# Patient Record
Sex: FEMALE | Race: BLACK OR AFRICAN AMERICAN | Employment: STUDENT | URBAN - METROPOLITAN AREA
[De-identification: names, ages, dates, MRNs, and addresses within clinical notes are randomized per-mention and may not be internally consistent; named-entity substitution may affect disease eponyms.]

---
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
## 2024-10-14 LAB
ANTIBODY SCREEN OB: NEGATIVE
HEPATITIS B SURFACE ANTIGEN OB: NEGATIVE
HIV RESULT OB: NEGATIVE
RAPID PLASMA REAGIN OB: NONREACTIVE
RH FACTOR OB: POSITIVE

## 2025-02-13 ENCOUNTER — ULTRASOUND ENCOUNTER (OUTPATIENT)
Dept: PERINATAL CARE | Facility: HOSPITAL | Age: 20
End: 2025-02-13
Attending: OBSTETRICS & GYNECOLOGY
Payer: MEDICAID

## 2025-02-13 ENCOUNTER — HOSPITAL ENCOUNTER (INPATIENT)
Facility: HOSPITAL | Age: 20
LOS: 4 days | Discharge: HOME | End: 2025-02-17
Attending: OBSTETRICS & GYNECOLOGY | Admitting: OBSTETRICS & GYNECOLOGY
Payer: MEDICAID

## 2025-02-13 DIAGNOSIS — O60.02: Primary | ICD-10-CM

## 2025-02-13 DIAGNOSIS — R52 PAIN: Primary | ICD-10-CM

## 2025-02-13 DIAGNOSIS — R52 PAIN: ICD-10-CM

## 2025-02-13 PROBLEM — O60.00 PRETERM LABOR (HCC): Status: ACTIVE | Noted: 2025-02-13

## 2025-02-13 LAB
ALBUMIN SERPL-MCNC: 4.8 G/DL (ref 3.2–4.8)
ALBUMIN/GLOB SERPL: 1.5 {RATIO} (ref 1–2)
ALP LIVER SERPL-CCNC: 51 U/L
ALT SERPL-CCNC: <7 U/L
ANION GAP SERPL CALC-SCNC: 10 MMOL/L (ref 0–18)
AST SERPL-CCNC: 16 U/L (ref ?–34)
BASOPHILS # BLD AUTO: 0.02 X10(3) UL (ref 0–0.2)
BASOPHILS NFR BLD AUTO: 0.2 %
BILIRUB SERPL-MCNC: 0.7 MG/DL (ref 0.3–1.2)
BILIRUB UR QL STRIP.AUTO: NEGATIVE
BUN BLD-MCNC: 7 MG/DL (ref 9–23)
CALCIUM BLD-MCNC: 10.2 MG/DL (ref 8.7–10.6)
CHLORIDE SERPL-SCNC: 103 MMOL/L (ref 98–112)
CO2 SERPL-SCNC: 24 MMOL/L (ref 21–32)
COLOR UR AUTO: YELLOW
CREAT BLD-MCNC: 0.65 MG/DL
EGFRCR SERPLBLD CKD-EPI 2021: 128 ML/MIN/1.73M2 (ref 60–?)
EOSINOPHIL # BLD AUTO: 0.01 X10(3) UL (ref 0–0.7)
EOSINOPHIL NFR BLD AUTO: 0.1 %
ERYTHROCYTE [DISTWIDTH] IN BLOOD BY AUTOMATED COUNT: 13.5 %
FASTING STATUS PATIENT QL REPORTED: NO
FIBRONECTIN FETAL VAG QL: POSITIVE
GLOBULIN PLAS-MCNC: 3.1 G/DL (ref 2–3.5)
GLUCOSE BLD-MCNC: 74 MG/DL (ref 70–99)
GLUCOSE UR STRIP.AUTO-MCNC: NORMAL MG/DL
HCT VFR BLD AUTO: 35.2 %
HGB BLD-MCNC: 12.1 G/DL
IMM GRANULOCYTES # BLD AUTO: 0.05 X10(3) UL (ref 0–1)
IMM GRANULOCYTES NFR BLD: 0.5 %
KETONES UR STRIP.AUTO-MCNC: 40 MG/DL
LEUKOCYTE ESTERASE UR QL STRIP.AUTO: 500
LYMPHOCYTES # BLD AUTO: 2.11 X10(3) UL (ref 1–4)
LYMPHOCYTES NFR BLD AUTO: 22.1 %
MCH RBC QN AUTO: 31.7 PG (ref 26–34)
MCHC RBC AUTO-ENTMCNC: 34.4 G/DL (ref 31–37)
MCV RBC AUTO: 92.1 FL
MONOCYTES # BLD AUTO: 0.54 X10(3) UL (ref 0.1–1)
MONOCYTES NFR BLD AUTO: 5.6 %
NEUTROPHILS # BLD AUTO: 6.83 X10 (3) UL (ref 1.5–7.7)
NEUTROPHILS # BLD AUTO: 6.83 X10(3) UL (ref 1.5–7.7)
NEUTROPHILS NFR BLD AUTO: 71.5 %
NITRITE UR QL STRIP.AUTO: NEGATIVE
OSMOLALITY SERPL CALC.SUM OF ELEC: 281 MOSM/KG (ref 275–295)
PH UR STRIP.AUTO: 6.5 [PH] (ref 5–8)
PLATELET # BLD AUTO: 232 10(3)UL (ref 150–450)
POTASSIUM SERPL-SCNC: 3.5 MMOL/L (ref 3.5–5.1)
PROT SERPL-MCNC: 7.9 G/DL (ref 5.7–8.2)
PROT UR STRIP.AUTO-MCNC: 30 MG/DL
RBC # BLD AUTO: 3.82 X10(6)UL
RBC UR QL AUTO: NEGATIVE
SODIUM SERPL-SCNC: 137 MMOL/L (ref 136–145)
SP GR UR STRIP.AUTO: 1.03 (ref 1–1.03)
UROBILINOGEN UR STRIP.AUTO-MCNC: NORMAL MG/DL
WBC # BLD AUTO: 9.6 X10(3) UL (ref 4–11)

## 2025-02-13 PROCEDURE — 76817 TRANSVAGINAL US OBSTETRIC: CPT

## 2025-02-13 PROCEDURE — 76811 OB US DETAILED SNGL FETUS: CPT | Performed by: OBSTETRICS & GYNECOLOGY

## 2025-02-13 RX ORDER — BETAMETHASONE SODIUM PHOSPHATE AND BETAMETHASONE ACETATE 3; 3 MG/ML; MG/ML
12 INJECTION, SUSPENSION INTRA-ARTICULAR; INTRALESIONAL; INTRAMUSCULAR; SOFT TISSUE EVERY 24 HOURS
Status: COMPLETED | OUTPATIENT
Start: 2025-02-13 | End: 2025-02-14

## 2025-02-13 RX ORDER — CHOLECALCIFEROL (VITAMIN D3) 25 MCG
1 TABLET,CHEWABLE ORAL DAILY
Status: DISCONTINUED | OUTPATIENT
Start: 2025-02-13 | End: 2025-02-15

## 2025-02-13 RX ORDER — SODIUM CHLORIDE, SODIUM LACTATE, POTASSIUM CHLORIDE, CALCIUM CHLORIDE 600; 310; 30; 20 MG/100ML; MG/100ML; MG/100ML; MG/100ML
INJECTION, SOLUTION INTRAVENOUS CONTINUOUS
Status: DISCONTINUED | OUTPATIENT
Start: 2025-02-13 | End: 2025-02-15

## 2025-02-13 RX ORDER — ACETAMINOPHEN 500 MG
500 TABLET ORAL EVERY 6 HOURS PRN
Status: DISCONTINUED | OUTPATIENT
Start: 2025-02-13 | End: 2025-02-15

## 2025-02-13 RX ORDER — ACETAMINOPHEN 500 MG
1000 TABLET ORAL EVERY 6 HOURS PRN
Status: DISCONTINUED | OUTPATIENT
Start: 2025-02-13 | End: 2025-02-15

## 2025-02-13 RX ORDER — ZOLPIDEM TARTRATE 5 MG/1
5 TABLET ORAL NIGHTLY PRN
Status: DISCONTINUED | OUTPATIENT
Start: 2025-02-13 | End: 2025-02-15

## 2025-02-13 RX ORDER — ONDANSETRON 2 MG/ML
4 INJECTION INTRAMUSCULAR; INTRAVENOUS EVERY 6 HOURS PRN
Status: DISCONTINUED | OUTPATIENT
Start: 2025-02-13 | End: 2025-02-15

## 2025-02-13 RX ORDER — SODIUM CHLORIDE, SODIUM LACTATE, POTASSIUM CHLORIDE, CALCIUM CHLORIDE 600; 310; 30; 20 MG/100ML; MG/100ML; MG/100ML; MG/100ML
INJECTION, SOLUTION INTRAVENOUS CONTINUOUS
Status: DISCONTINUED | OUTPATIENT
Start: 2025-02-13 | End: 2025-02-15 | Stop reason: HOSPADM

## 2025-02-13 RX ORDER — NIFEDIPINE 20 MG/1
20 CAPSULE ORAL EVERY 8 HOURS SCHEDULED
Status: DISCONTINUED | OUTPATIENT
Start: 2025-02-13 | End: 2025-02-15

## 2025-02-13 RX ORDER — NIFEDIPINE 20 MG/1
20 CAPSULE ORAL EVERY 8 HOURS SCHEDULED
Status: DISPENSED | OUTPATIENT
Start: 2025-02-13 | End: 2025-02-14

## 2025-02-13 RX ORDER — ONDANSETRON 2 MG/ML
INJECTION INTRAMUSCULAR; INTRAVENOUS
Status: COMPLETED
Start: 2025-02-13 | End: 2025-02-13

## 2025-02-13 RX ORDER — CALCIUM CARBONATE 500 MG/1
1000 TABLET, CHEWABLE ORAL
Status: DISCONTINUED | OUTPATIENT
Start: 2025-02-13 | End: 2025-02-15

## 2025-02-13 RX ORDER — DOCUSATE SODIUM 100 MG/1
100 CAPSULE, LIQUID FILLED ORAL 2 TIMES DAILY
Status: DISCONTINUED | OUTPATIENT
Start: 2025-02-13 | End: 2025-02-14

## 2025-02-13 NOTE — H&P
On call Laborist 25    [facility]  Obstetrics and Gynecology    OB/GYN: Triage History and Physical        Subjective:     Klaudia Cerna is a 19 year old  female at 24w4d Estimated Date of Delivery: 25 who is being seen in triage with complaint of cramping off and on and possible \"loss of mucous plug\". The patient reports cramping off and on since yesterday. Was constipated but had BM today.. possible Ucx. neg LOF. neg VB. Fetal Movement: normal.    Review of Systems:  General:  denies fevers, chills, fatigue and malaise.   Respiratory:  denies SOB, dyspnea, cough or wheezing  Cardiovascular:  denies chest pain, palpitations, exercise intolerance   GI:cramping as above, neg diarrhea, + constipation, Hyperemesis earlier in pregnancy, + THC use at that time  :  denies dysuria, hematuria, increased urinary frequency.     Past Medical History:    Anxiety    Depression    H/O self-harm     H/o Disordered eating    No past surgical history on file.    Allergies[1]      Social History     Socioeconomic History    Marital status: Single     Spouse name: Not on file    Number of children: Not on file    Years of education: Not on file    Highest education level: Not on file   Occupational History    Not on file   Tobacco Use    Smoking status: Passive Smoke Exposure - Never Smoker    Smokeless tobacco: Never   Vaping Use    Vaping status: Never Used   Substance and Sexual Activity    Alcohol use: Never    Drug use: Never    Sexual activity: Not on file   Other Topics Concern    Not on file   Social History Narrative    Not on file     Social Drivers of Health     Food Insecurity: Food Insecurity Present (2025)    NCSS - Food Insecurity     Worried About Running Out of Food in the Last Year: Yes     Ran Out of Food in the Last Year: Yes   Transportation Needs: Unmet Transportation Needs (2025)    NCSS - Transportation     Lack of Transportation: Yes   Stress: Stress Concern Present  (2025)    Stress     Feeling of Stress : Yes   Housing Stability: Not At Risk (2025)    NCSS - Housing/Utilities     Has Housing: Yes     Worried About Losing Housing: No     Unable to Get Utilities: No      + THC use    No family history on file.    Roseline with hx of incompetent cervix       Objective:      Vitals:    25 1229   Weight: 130 lb (59 kg)         General:   alert, appears stated age, and cooperative   CV: Normal peripheral perfusion    Lungs: No tachypnea, non-labored breathing    Abdomen:  soft, non-tender; bowel sounds normal; no masses,  no organomegaly, gravid   Ext: No calf tenderness or edema bilaterally    FHT: mod variability / 140 BPM / pos accel / appropriate for gestational age   TOCO Irreg, irritability   SVE:     Dilation: Closed    Effacement: Long    Station:  Floating     Yellow mucous discharge     Assessment:     Klaudia Cerna is a 19 year old  female at 24w4d who is being seen in triage for cramping, mucous discharge  Limited prenatal care  Hx of anxiety, disordered eating--was on meds until found out was pregnant at 6 wks  Hx of thc use, with hyperemesis early in pregnancy.          Plan:     Gc/chlamydia from urine  Urinalysis  Fetal fibrinectin  Vaginitis probe done  Ivfs  Cbc, cmp  Mfm ultrasound.  Obervation at this time        Note to patient and family   The 21st Century Cures Act makes medical notes available to patients in the interest of transparency.  However, please be advised that this is a medical document.  It is intended as [id number]-[id number] communication.  It is written and medical language may contain abbreviations or verbiage that are technical and unfamiliar.  It may appear blunt or direct.  Medical documents are intended to carry relevant information, facts as evident, and the clinical opinion of the practitioner.                [1] No Known Allergies

## 2025-02-13 NOTE — PROGRESS NOTES
On call Laborist 25    Reviewed MFM findings.  Will give steroids  Nifedipine 20 mg q 8 hours  Continue ivfs    Reviewed with pt and family  Previous sab x 4---no dilation/curettage. No cervical instrumentation    Reviewed with pt/family  If seems that may have  delivery, will need magnesium sulfate.    Will await vaginitis panal    Mfm on consult.    All of above d/w pt and family  Questions answered.

## 2025-02-13 NOTE — CONSULTS
[facility]  Non-Surgical Consult    Klaudia Cerna Patient Status:  Outpatient    3/13/2005 MRN [id number]   Location [facility] LABOR & DELIVERY Attending Patria Gonsalez MD   Hosp Day # 0 PCP None Pcp     SUBJECTIVE:  Reason for Admission:  Pain and cramping.    History of Present Illness:  Patient is a 19 year old female.    Patient's last menstrual period was 2024.    She presented to ER complaint of cramping off and on and possible \"loss of mucous plug\". The patient reports cramping off and on since yesterday. Was constipated but had BM today.. Planning in transferring care to different doctor.    Fetal fibrinectin Positive.      Medications:  Prescriptions Prior to Admission[1]    Allergies:  Allergies[2]     Past Medical History:  Past Medical History:    Anxiety    Depression    H/O self-harm       Past Surgical History:  No past surgical history on file.    Past OB History:  OB History    Para Term  AB Living   5 0 0 0 4 0   SAB IAB Ectopic Multiple Live Births                  # Outcome Date GA Lbr Ramone/2nd Weight Sex Type Anes PTL Lv   5 Current            4 AB 2017     SAB  N    3 AB            2 AB            1 AB                  Social History:  Social History     Tobacco Use    Smoking status: Passive Smoke Exposure - Never Smoker    Smokeless tobacco: Never   Substance Use Topics    Alcohol use: Never        Review of Systems:  Unremarkable except as above    OBJECTIVE:  Temp:  [99 °F (37.2 °C)] 99 °F (37.2 °C)  Pulse:  [90] 90  Resp:  [18] 18  BP: (123)/(69) 123/69  SpO2:  [94 %] 94 %  No intake or output data in the 24 hours ending 25 1513    Physical Exam:  General appearance: alert, appears stated age, cooperative, and no distress  Heart: regular rate and rhythm  Abdomen: soft, non-tender; bowel sounds normal; no masses,  no organomegaly  Extremities: extremities normal, atraumatic, no cyanosis or edema  Neurologic: Grossly  normal    Diagnostics:    OB ULTRASOUND REPORT   See imaging tab for complete ultrasound report or in PACS    Single IUP in cephalic presentation.    Placenta is posterior.   A 3 vessel cord is noted.  Cardiac activity is present at 170 bpm   g ( 1 lb 11 oz);   MVP is 4.5 cm    Approach - Transvaginal Cervical length 13.8 mm  Cervical length with fundal pressure 14.0 mm  Funneling: Funnel width with fundal pressure 12.0 mm, Funnel length with fundal pressure 13.0 mm    Uterus and adnexa appeared normal today on ultrasound      ==============================================      Data Review:   Lab Results   Component Value Date    WBC 9.6 2025    HGB 12.1 2025    HCT 35.2 2025    .0 2025    CREATSERUM 0.65 2025    BUN 7 2025     2025    K 3.5 2025     2025    CO2 24.0 2025    GLU 74 2025    CA 10.2 2025    ALB 4.8 2025    ALKPHO 51 2025    BILT 0.7 2025    TP 7.9 2025    AST 16 2025    ALT <7 2025       DISCUSSION  During her visit we discussed and reviewed the following issues:    We discussed the morbidity and mortality associated with prematurity at various gestational ages.  The signs and symptoms of  labor were discussed.  We talked about potential risks and benefits associated with tocolytic therapy and  steroid.       ASSESSMENT:  IUP at 24w4d    labor  Short cervix-- 1.4 cm with funneling    PLAN:  Betamethasone  Tocolysis if regular contractions noted.    Discussed with Dr. Gonsalez    Thank you for allowing me to participate in the care of your patient.  Please do not hesitate to call with any questions or concerns.     Total floor time was 55 minutes in evaluation, consultation, and coordination of care.  Greater than 50% of this time was spent in face to face discussion with the patient.      Sid Allen D.O.  2025  3:13 PM         [1]    Medications Prior to Admission   Medication Sig Dispense Refill Last Dose/Taking    OXcarbazepine 150 MG Oral Tab Take by mouth 2 (two) times daily.   Unknown    ARIPiprazole 10 MG Oral Tab Take 1 tablet (10 mg total) by mouth daily.   Unknown    cephalexin 500 MG Oral Cap Take 1 capsule (500 mg total) by mouth 4 (four) times daily.      [2] No Known Allergies

## 2025-02-13 NOTE — PROGRESS NOTES
1431 Pt transferred in stable condition via wheelchair to [facility] for complete ultrasound.

## 2025-02-13 NOTE — PROGRESS NOTES
@ 1549 Received pt back from [facility] ultrasound. 1550 Dr. Gonsalez at bedside with updated POC.  New orders received. Pt then transferred to antepartum room 117 for stay.  Pt is calm and verbalized understanding.

## 2025-02-14 ENCOUNTER — ULTRASOUND ENCOUNTER (OUTPATIENT)
Dept: PERINATAL CARE | Facility: HOSPITAL | Age: 20
End: 2025-02-14
Attending: OBSTETRICS & GYNECOLOGY
Payer: MEDICAID

## 2025-02-14 LAB
ANTIBODY SCREEN: NEGATIVE
BV BACTERIA DNA VAG QL NAA+PROBE: POSITIVE
C GLABRATA DNA VAG QL NAA+PROBE: NEGATIVE
C KRUSEI DNA VAG QL NAA+PROBE: NEGATIVE
C TRACH DNA SPEC QL NAA+PROBE: NEGATIVE
CANDIDA DNA VAG QL NAA+PROBE: POSITIVE
N GONORRHOEA DNA SPEC QL NAA+PROBE: NEGATIVE
RH BLOOD TYPE: POSITIVE
RH BLOOD TYPE: POSITIVE
T VAGINALIS DNA VAG QL NAA+PROBE: NEGATIVE

## 2025-02-14 PROCEDURE — 76815 OB US LIMITED FETUS(S): CPT | Performed by: OBSTETRICS & GYNECOLOGY

## 2025-02-14 RX ORDER — BISACODYL 10 MG
10 SUPPOSITORY, RECTAL RECTAL
Status: DISCONTINUED | OUTPATIENT
Start: 2025-02-14 | End: 2025-02-15

## 2025-02-14 RX ORDER — SODIUM PHOSPHATE, DIBASIC AND SODIUM PHOSPHATE, MONOBASIC 7; 19 G/230ML; G/230ML
1 ENEMA RECTAL ONCE AS NEEDED
Status: DISCONTINUED | OUTPATIENT
Start: 2025-02-14 | End: 2025-02-15

## 2025-02-14 RX ORDER — TERBUTALINE SULFATE 1 MG/ML
INJECTION SUBCUTANEOUS
Status: COMPLETED
Start: 2025-02-14 | End: 2025-02-14

## 2025-02-14 RX ORDER — POLYETHYLENE GLYCOL 3350 17 G/17G
17 POWDER, FOR SOLUTION ORAL DAILY PRN
Status: DISCONTINUED | OUTPATIENT
Start: 2025-02-14 | End: 2025-02-15

## 2025-02-14 RX ORDER — HYDROMORPHONE HYDROCHLORIDE 1 MG/ML
0.2 INJECTION, SOLUTION INTRAMUSCULAR; INTRAVENOUS; SUBCUTANEOUS
Status: DISCONTINUED | OUTPATIENT
Start: 2025-02-14 | End: 2025-02-14

## 2025-02-14 RX ORDER — INDOMETHACIN 50 MG/1
50 CAPSULE ORAL ONCE
Status: COMPLETED | OUTPATIENT
Start: 2025-02-14 | End: 2025-02-14

## 2025-02-14 RX ORDER — INDOMETHACIN 50 MG/1
50 CAPSULE ORAL
Status: DISCONTINUED | OUTPATIENT
Start: 2025-02-14 | End: 2025-02-14

## 2025-02-14 RX ORDER — HYDROMORPHONE HYDROCHLORIDE 1 MG/ML
1 INJECTION, SOLUTION INTRAMUSCULAR; INTRAVENOUS; SUBCUTANEOUS
Status: DISCONTINUED | OUTPATIENT
Start: 2025-02-14 | End: 2025-02-15

## 2025-02-14 RX ORDER — 0.9 % SODIUM CHLORIDE 0.9 %
INTRAVENOUS SOLUTION INTRAVENOUS
Status: DISPENSED
Start: 2025-02-14 | End: 2025-02-14

## 2025-02-14 RX ORDER — INDOMETHACIN 25 MG/1
25 CAPSULE ORAL EVERY 6 HOURS
Status: DISCONTINUED | OUTPATIENT
Start: 2025-02-14 | End: 2025-02-15

## 2025-02-14 RX ORDER — AMPICILLIN 2 G/1
INJECTION, POWDER, FOR SOLUTION INTRAVENOUS
Status: DISPENSED
Start: 2025-02-14 | End: 2025-02-14

## 2025-02-14 RX ORDER — DOCUSATE SODIUM 100 MG/1
100 CAPSULE, LIQUID FILLED ORAL 2 TIMES DAILY
Status: DISCONTINUED | OUTPATIENT
Start: 2025-02-14 | End: 2025-02-15

## 2025-02-14 RX ORDER — CALCIUM GLUCONATE 94 MG/ML
1 INJECTION, SOLUTION INTRAVENOUS ONCE AS NEEDED
Status: DISCONTINUED | OUTPATIENT
Start: 2025-02-14 | End: 2025-02-15

## 2025-02-14 RX ORDER — TERBUTALINE SULFATE 1 MG/ML
0.25 INJECTION SUBCUTANEOUS
Status: ACTIVE | OUTPATIENT
Start: 2025-02-14 | End: 2025-02-14

## 2025-02-14 NOTE — PLAN OF CARE
Problem: Patient/Family Goals  Goal: Patient/Family Long Term Goal  Description: Patient's Long Term Goal: stay pregnant    Interventions:  - tests, labs, meds  - See additional Care Plan goals for specific interventions  Outcome: Progressing  Goal: Patient/Family Short Term Goal  Description: Patient's Short Term Goal: Pain control    Interventions:   - position changes, IV fluids, meds.   - See additional Care Plan goals for specific interventions  Outcome: Progressing

## 2025-02-14 NOTE — PROGRESS NOTES
Called to bedside. Patient had been sleeping comfortably and then up to bathroom with sensation to have BM.  After this returned to bed with complaints of pressure in rectum..  Denies UCx or cramping. +FM.  5 min later patient with vaginal bleeding.  SVE by RN revealed patient complete with BBOW.    , + LTV without decels.  Uterine irritability  Abd NT  Bedside US vertex    24 5/7 with cervical incompetence  Plan magnesium bolus immediately, Ampicillin X 1 dose  S/p steroids X 1.  Delivery inevitable.

## 2025-02-14 NOTE — PROGRESS NOTES
SUBJECTIVE:   pt without complaints.  Feeling occasional mild contractions. No abdominal pain. Good fetal movement. Slight pink vaginal discharge. Less pressure than overnight. Was sleeping when I went in her room.     OBJECTIVE:  VS:  weight is 130 lb (59 kg). Her oral temperature is 98.3 °F (36.8 °C). Her blood pressure is 108/83 and her pulse is 84. Her respiration is 16 and oxygen saturation is 100%.   Abdomen- soft gravid NT  Extremities- no edema NT Bilateral lower extremities    Fetal Surveillance:  FHT-140, mod neeraj, no decells  TOCO- q7-8min    Cervix: hourglass membranes. No fetal presenting parts noted. Unable to assess cervix    Labs-  Recent Labs   Lab 25  1323   RBC 3.82   HGB 12.1   HCT 35.2   MCV 92.1   MCH 31.7   MCHC 34.4   RDW 13.5   NEPRELIM 6.83   WBC 9.6   .0       tocolytics- magnesium 2g/hr    ASSESSMENT/PLAN:    1.19 year old y/o, [id number] 24w5d  2. FWB-reassuring  3. Prematurity- steroids first dose last night  4. Pre-term labor- poss hourglass membranes and not full dilation. Will have MFM perform ultrasound to see actual dilation of cervix

## 2025-02-14 NOTE — PLAN OF CARE
Problem: Patient/Family Goals  Goal: Patient/Family Long Term Goal  Description: Patient's Long Term Goal: Maternal/Fetal wellbeing    Interventions:  - See additional Care Plan goals for specific interventions  Outcome: Progressing  Goal: Patient/Family Short Term Goal  Description: Patient's Short Term Goal: stop contractions    Interventions:   - See additional Care Plan goals for specific interventions  Outcome: Progressing     Problem: PAIN - ADULT  Goal: Verbalizes/displays adequate comfort level or patient's stated pain goal  Description: INTERVENTIONS:  - Encourage pt to monitor pain and request assistance  - Assess pain using appropriate pain scale  - Administer analgesics based on type and severity of pain and evaluate response  - Implement non-pharmacological measures as appropriate and evaluate response  - Consider cultural and social influences on pain and pain management  - Manage/alleviate anxiety  - Utilize distraction and/or relaxation techniques  - Monitor for opioid side effects  - Notify MD/LIP if interventions unsuccessful or patient reports new pain  - Anticipate increased pain with activity and pre-medicate as appropriate  Outcome: Progressing     Problem: ANTEPARTUM/LABOR and DELIVERY  Goal: Maintain pregnancy as long as maternal and/or fetal condition is stable  Description: INTERVENTIONS:  - Maternal surveillance  - Fetal surveillance  - Monitor uterine activity  - Medications as ordered  - Bedrest  Outcome: Progressing  Goal: Anxiety is at manageable level  Description: INTERVENTIONS:  - [place] patient to unit/surroundings  - Establish a trusting relationship with patient  - Discuss possible complications or alterations in birth plan  - Explain treatment plan  - Explain testing/procedures prior to initiation  - Encourage participation in care  - Encourage verbalization of concerns/fears  - Identify coping mechanisms  - Administer/offer alternative therapies (Aroma therapy, distraction,  guided imagery, massage, music therapy, therapeutic touch)  - Manage patient's environment (Avoid overstimulation of patient)  Outcome: Progressing  Goal: Demonstrates ability to cope with hospitalization/illness  Description: INTERVENTIONS:  - Encourage verbalization of feelings/concerns/expectations  - Provide quiet environment  - Assist patient to identify own strengths and abilities  - Encourage patient to set small goals for self  - Encourage participation in diversional activity  - Reinforce positive adaptation of new coping behaviors  - Include patient/family/caregiver in decisions  Outcome: Progressing

## 2025-02-14 NOTE — PLAN OF CARE
Problem: Patient/Family Goals  Goal: Patient/Family Long Term Goal  Description: Patient's Long Term Goal: Maternal/Fetal wellbeing    Interventions:  - See additional Care Plan goals for specific interventions  Outcome: Progressing  Goal: Patient/Family Short Term Goal  Description: Patient's Short Term Goal: stop contractions    Interventions:   - See additional Care Plan goals for specific interventions  Outcome: Progressing     Problem: PAIN - ADULT  Goal: Verbalizes/displays adequate comfort level or patient's stated pain goal  Description: INTERVENTIONS:  - Encourage pt to monitor pain and request assistance  - Assess pain using appropriate pain scale  - Administer analgesics based on type and severity of pain and evaluate response  - Implement non-pharmacological measures as appropriate and evaluate response  - Consider cultural and social influences on pain and pain management  - Manage/alleviate anxiety  - Utilize distraction and/or relaxation techniques  - Monitor for opioid side effects  - Notify MD/LIP if interventions unsuccessful or patient reports new pain  - Anticipate increased pain with activity and pre-medicate as appropriate  Outcome: Progressing     Problem: ANTEPARTUM/LABOR and DELIVERY  Goal: Maintain pregnancy as long as maternal and/or fetal condition is stable  Description: INTERVENTIONS:  - Maternal surveillance  - Fetal surveillance  - Monitor uterine activity  - Medications as ordered  - Bedrest  Outcome: Progressing  Goal: Anxiety is at manageable level  Description: INTERVENTIONS:  - [place] patient to unit/surroundings  - Establish a trusting relationship with patient  - Discuss possible complications or alterations in birth plan  - Explain treatment plan  - Explain testing/procedures prior to initiation  - Encourage participation in care  - Encourage verbalization of concerns/fears  - Identify coping mechanisms  - Administer/offer alternative therapies (Aroma therapy, distraction,  guided imagery, massage, music therapy, therapeutic touch)  - Manage patient's environment (Avoid overstimulation of patient)  Outcome: Progressing  Goal: Demonstrates ability to cope with hospitalization/illness  Description: INTERVENTIONS:  - Encourage verbalization of feelings/concerns/expectations  - Provide quiet environment  - Assist patient to identify own strengths and abilities  - Encourage patient to set small goals for self  - Encourage participation in diversional activity  - Reinforce positive adaptation of new coping behaviors  - Include patient/family/caregiver in decisions  Outcome: Progressing     Problem: COPING  Goal: Pt/Family able to verbalize concerns and demonstrate effective coping strategies  Description: INTERVENTIONS:  - Assist patient/family to identify coping skills, available support systems and cultural and spiritual values  - Provide emotional support, including active listening and acknowledgement of concerns of patient and caregivers  - Reduce environmental stimuli, as able  - Instruct patient/family in relaxation techniques, as appropriate  - Assess for spiritual and psychosocial needs and initiate Spiritual Care or Behavioral Health consult as needed  Outcome: Progressing

## 2025-02-14 NOTE — CM/SW NOTE
SW order acknowledged. Case discussed with RN.  Patient presented with tearful affect. SW provided support and normalization.  Grandmother at bedside.   SW answered NICU questions and provided therapeutic relationship.   No further immediate concerns reported at this time. RN updated.     Cathy Gauthier LCSW  /Discharge Planner

## 2025-02-14 NOTE — CONSULTS
[facility]   part of [facility]    Maternal Fetal Medicine Progress Note    Klaudia Cerna Patient Status:  Inpatient    3/13/2005 MRN [id number]   Location [facility] LABOR & DELIVERY Attending Patria Gonsalez MD   Hosp Day # 1 PCP None Pcp     SUBJECTIVE:    She is periodically feeling hot from the magnesium.  She feels vaginal pressure. History of early pregnancy losses, but no second trimester losses.    OBJECTIVE:  Temp:  [98.1 °F (36.7 °C)-98.5 °F (36.9 °C)] 98.5 °F (36.9 °C)  Pulse:  [] 86  Resp:  [16-20] 16  BP: ()/() 116/54  SpO2:  [76 %-100 %] 98 %  Intake/Output                   25 0700 - 25 0659 (Not Admitted) 25 0700 - 25 0659 25 0700 - 02/15/25 0659       Intake    I.V.  --  2550  750    Volume (mL) Magnesium -- 125 --    Volume (mL) (lactated ringers infusion) -- 450 --    Volume (mL) (lactated ringers infusion) -- 1975 750    IV PIGGYBACK  --  --  200    Volume (mL) (ampicillin (Omnipen) 2 g in sodium chloride 0.9% 100 mL IVPB-MBP) -- -- 200    Total Intake -- 2550 950       Output    Urine  --  300  2000    Urine -- 300 2000    Urine Occurrence -- 1 x 3 x    Stool  --  --  --    Stool Count Calculated for I/O -- 1 x --    Total Output -- 300 2000       Net I/O     -- 2250 -1050            Physical Exam:  Physical Exam  Constitutional:       Appearance: Normal appearance.      Comments: Appears overall uncomfortable   Pulmonary:      Effort: Pulmonary effort is normal.   Abdominal:      Palpations: Abdomen is soft.   Neurological:      Mental Status: She is alert.   Psychiatric:         Mood and Affect: Mood normal.         Behavior: Behavior normal.           Labs:           ANTIBODY SCREEN OB   Date Value Ref Range Status   10/14/2024 Negative Negative Final     RUBELLA ANTIBODIES, IGG OB   Date Value Ref Range Status   10/14/2024 Immune Immune Final     HEPATITIS B SURFACE ANTIGEN OB   Date Value Ref Range Status   10/14/2024  Negative Negative, Unknown Final     RAPID PLASMA REAGIN OB   Date Value Ref Range Status   10/14/2024 Nonreactive Nonreactive, Equivocal Final         Ultrasound:Ultrasound Findings:     Single IUP in cephalic presentation within the uterus.  Head is in lower uterine segment.   Placenta is posterior.   Cardiac activity is present at 149 bpm  MVP is 4.1 cm .   Cervical dilation is 2cm with bulging membranes 6 cm into the vagina.  Debris is seen in the membranes.    Please see imaging tab for fulll report.     LABOR    Background   birth is the leading cause of  mortality and the most common reason for  hospitalization. In the [place], approximately 12% of all live births occur before term, and  labor preceded approximately 50% of these  births. Although the causes of  labor are not well understood, the burden of  births is clear-- births account for approximately 70% of  deaths and 36% of infant deaths as well as 25-50% of cases of long-term neurologic impairment in children.    Definition   birth is defined as birth between 20 0/7 weeks of gestation and 36 6/7 weeks of gestation. The diagnosis of  labor generally is based on clinical criteria of regular uterine contractions accompanied by a change in cervical dilation, effacement, or both or initial presentation with regular contractions and cervical dilation of at least 2 cm. Less than 10% of women with the clinical diagnosis of  labor actually give birth within 7 days of presentation.    Screening / Identification  Although the results of observational studies have suggested that knowledge of fetal fibronectin status or cervical length may help health care providers reduce use of unnecessary resources, these findings have not been confirmed by randomized trials. Further, the positive predictive value of a positive fetal fibronectin test result or a short cervix  alone is poor and should not be used exclusively to direct management in the setting of acute symptoms.  Identifying women with  labor who ultimately will give birth  is difficult. Approximately 30% of  labor spontaneously resolves and 50% of patients hospitalized for  labor actually give birth at term.     Interventions to reduce the likelihood of delivery should be reserved for women with  labor at a gestational age at which a delay in delivery will provide benefit to the . Because tocolytic therapy generally is effective for up to 48 hours, only women with fetuses that would benefit from a 48-hour delay in delivery should receive tocolytic treatment.    Treatment Principles  Nonpharmacologic treatments to prevent  births in women with  labor have included bed rest, abstention from intercourse and orgasm, and hydration. Evidence for the effectiveness of these interventions is lacking, and adverse effects have been reported. therapeutic agents currently thought to be clearly associated with improved  outcomes include  corticosteroids, for maturation of fetal lungs and other developing organ systems, and the targeted use of magnesium sulfate for fetal neuroprotection. In general, tocolytics are not indicated for use before  viability. Regardless of interventions,  morbidity and mortality at that time are too high to justify the maternal risks associated with tocolytic therapy. Similarly, no data exist regarding the efficacy of corticosteroid use before viability. However, there may be times when it is appropriate to administer tocolytics before viability. For example, inhibiting contractions in a patient after an event known to cause  labor, such as intra-abdominal surgery, may be reasonable even at previable gestational ages, although the efficacy of such an intervention remains unproved,    The upper limit for the use  of tocolytic agents to prevent  birth generally has been 34 weeks of gestation. Because of the possible risks associated with tocolytic and steroid therapies, the use of these drugs should be limited to women with  labor at high risk of spontaneous  birth. Tocolysis is contraindicated when the maternal and fetal risks of prolonging pregnancy or the risks associated with these drugs are greater than the risks associated with  birth.    Common contraindications include:  Intrauterine fetal demise  Lethal fetal anomaly  Severe preeclampsia or eclampsia  Maternal bleeding with hemodynamic instability  Chorioamnionitis   premature rupture of membranes (in the absence of infection may be considered for steroid administration)  Maternal contraindications to tocolysis (agent specific)     contractions are common; however, these contractions do not reliably predict which women will have subsequent progressive cervical change. In a study of 763 women who had unscheduled triage visits for symptoms of  labor, only 18% gave birth before 37 weeks of gestation and only 3% gave birth within 2 weeks of presenting with symptoms.  No evidence exists to support the use of prophylactic tocolytic therapy, home uterine activity monitoring, cerclage, or narcotics to prevent  delivery in women with contractions but no cervical change. Therefore, women with  contractions without cervical change, especially those with a cervical dilation of less than 2 cm, generally should not be treated with tocolytics.    Corticosteroids  The most beneficial intervention for improvement of  outcomes among patients who give birth  is the administration of  corticosteroids. A single course of corticosteroids is recommended for pregnant women between 24 weeks and 34 weeks of gestation, and may be considered for pregnant women starting at 23 weeks of gestation, who are at  risk of  delivery within 7 days.    Neonates whose mothers receive  corticosteroids have significantly lower severity, frequency, or both of respiratory distress syndrome (relative risk [RR], 0.66; 95% confidence interval [CI], 0.59-0.73), intracranial hemorrhage (RR, 0.54; 95% CI, 0.43-0.69), necrotizing enterocolitis (RR, 0.46; 95% CI, 0.29-0.74), and death (RR, 0.69; 95% CI, 0.58-0.81), compared with neonates whose mothers did not receive  corticosteroids.    A single repeat course of  corticosteroids should be considered in women whose prior course of  corticosteroids was administered at least 7 days previously and who remain at risk of  birth before 34 weeks of gestation.  However, regularly scheduled repeat courses or multiple courses (more than two) are not currently recommended.    Because treatment with corticosteroids for less than 24 hours is still associated with significant reductions in  morbidity and mortality, a first dose of  corticosteroids should still be administered even if the ability to give the second dose is unlikely, based on the clinical scenario. However, no additional benefit has been demonstrated for courses of  steroids with dosage intervals shorter than those outlined previously, often referred to as accelerated dosing, even when delivery appears imminent.    Magnesium Sulfate For Neuroprotection  Several large clinical studies have evaluated the evidence regarding magnesium sulfate, neuroprotection, and  births.  A 2009 meta-analysis synthesized the results of the clinical trials of magnesium sulfate for neuroprotection.  Pooling the results of the available clinical trials of magnesium sulfate for neuroprotection suggests that predelivery administration of magnesium sulfate reduces the occurrence of cerebral palsy when given with neuroprotective intent (RR, 0.71; 95% CI, 0.55-0.91).  Two subsequent  meta-analyses of similar design have confirmed these results .    Accumulated available evidence suggests that magnesium sulfate reduces the severity and risk of cerebral palsy in surviving infants if administered when birth is anticipated before 32 weeks of gestation.    Tocolytic Therapy  Tocolytic therapy may provide short-term prolongation of pregnancy, enabling the administration of  corticosteroids and magnesium sulfate for neuroprotection, as well as transport, if indicated, to a tertiary facility. However, no evidence exists that tocolytic therapy has any direct favorable effect on  outcomes or that any prolongation of pregnancy afforded by tocolytics actually translates into statistically significant  benefit.    The use of magnesium sulfate to inhibit acute  labor has similar limitations when used for pregnancy prolongation.  However, if magnesium sulfate is being used in the context of  labor for fetal neuroprotection and the patient is still experiencing  labor, a different agent could be considered for short-term tocolysis. However, because of potential serious maternal complications, beta-adrenergic receptor agonists and calcium-channel blockers should be used with caution in combination with magnesium sulfate for this indication. Before 32 weeks of gestation, indomethacin is a potential option for use in conjunction with magnesium sulfate.    Maintenance therapy with tocolytics is ineffective for preventing  birth and improving  outcomes and is not recommended for this purpose. A meta-analysis has not shown any differences between magnesium sulfate maintenance therapy and either placebo or beta-adrenergic receptor agonists in preventing  birth after an initial treated episode of threatened  labor.    Antibiotics  A meta-analysis of eight randomized controlled trials that compared antibiotic treatment with placebo for patients  with documented  labor found no difference between the antibiotic treatment and placebo for prolonging pregnancy or preventing  delivery, respiratory distress syndrome, or  sepsis. In fact, antibiotic use may be associated with long-term harm. Thus, antibiotics should not be used to prolong gestation or improve  outcomes in women with  labor and intact membranes. This recommendation is distinct from recommendations for antibiotic use for pre-term premature rupture of membranes and group B streptococci carrier status.    Nonpharmocologic Treatments  Although bed rest and hydration have been recommended to women with symptoms of  labor to prevent  delivery, these measures have not been shown to be effective for the prevention of  birth and should not be routinely recommended. Furthermore, the potential harm, including venous thromboembolism, bone demineralization, and deconditioning, and the negative effects, such as loss of employment, should not be underestimated.        IMPRESSION:   IUP at 24w5d  Incompetent cervix with hourglassing membranes 6 cm into the vagina   labor      RECOMENDATIONS:   Complete betamethasone  Continue magnesium while concern for  labor and delivery   Start indomethacin  GBS prophylaxis  If fever or fetus delivering through cervix, recommend Pitocin.  If membranes rupture and fetus is within the uterus, then consider latency antibiotics  Recommend deferring next pregnancy for 18-24 months.  Recommend MFM consultation between pregnancies.  Next pregnancy, recommend cerclage at 12 weeks.    Questions/concerns were discussed with patient and/or family at bedside.    Total Time spent with patient and coordinating care:  50 minutes      Lourdes Cardenas MD    2025  5:57 PM

## 2025-02-14 NOTE — PROGRESS NOTES
[facility] ULTRASOUND REPORT   See imaging tab for complete consultation / ultrasound report      Ultrasound Findings:    Single IUP in cephalic presentation within the uterus.  Head is in lower uterine segment.   Placenta is posterior.   Cardiac activity is present at 149 bpm  MVP is 4.1 cm .   Cervical dilation is 2cm with bulging membranes 6 cm into the vagina.  Debris is seen in the membranes.       Lourdes Cardenas MD      This was an ultrasound only encounter (no physician visit).  The ultrasound was read by Dr. Cardenas .        Note to patient and family  The 21st Century Cures Act makes medical notes available to patients in the interest of transparency.  However, please be advised that this is a medical document.  It is intended as ybop-ye-lilv communication.  It is written and medical language may contain abbreviations or verbiage that are technical and unfamiliar.  It may appear blunt or direct.  Medical documents are intended to carry relevant information, facts as evident, and the clinical opinion of the practitioner.

## 2025-02-15 PROBLEM — O34.32 INCOMPETENT CERVIX DURING SECOND TRIMESTER, ANTEPARTUM (HCC): Status: ACTIVE | Noted: 2025-02-15

## 2025-02-15 PROBLEM — O60.02 PRETERM LABOR IN SECOND TRIMESTER WITHOUT DELIVERY (HCC): Status: ACTIVE | Noted: 2025-02-15

## 2025-02-15 LAB — MAGNESIUM SERPL-MCNC: 7.2 MG/DL (ref 1.6–2.6)

## 2025-02-15 PROCEDURE — 59025 FETAL NON-STRESS TEST: CPT | Performed by: OBSTETRICS & GYNECOLOGY

## 2025-02-15 PROCEDURE — 59409 OBSTETRICAL CARE: CPT | Performed by: OBSTETRICS & GYNECOLOGY

## 2025-02-15 PROCEDURE — 99231 SBSQ HOSP IP/OBS SF/LOW 25: CPT | Performed by: OBSTETRICS & GYNECOLOGY

## 2025-02-15 RX ORDER — DOCUSATE SODIUM 100 MG/1
100 CAPSULE, LIQUID FILLED ORAL
Status: DISCONTINUED | OUTPATIENT
Start: 2025-02-15 | End: 2025-02-15

## 2025-02-15 RX ORDER — TRANEXAMIC ACID 10 MG/ML
INJECTION, SOLUTION INTRAVENOUS
Status: COMPLETED
Start: 2025-02-15 | End: 2025-02-15

## 2025-02-15 RX ORDER — ACETAMINOPHEN 500 MG
1000 TABLET ORAL EVERY 6 HOURS PRN
Status: DISCONTINUED | OUTPATIENT
Start: 2025-02-15 | End: 2025-02-15

## 2025-02-15 RX ORDER — IBUPROFEN 600 MG/1
600 TABLET, FILM COATED ORAL EVERY 6 HOURS PRN
Status: DISCONTINUED | OUTPATIENT
Start: 2025-02-15 | End: 2025-02-15

## 2025-02-15 RX ORDER — AMMONIA 15 % (W/V)
0.3 AMPUL (EA) INHALATION AS NEEDED
Status: DISCONTINUED | OUTPATIENT
Start: 2025-02-15 | End: 2025-02-17

## 2025-02-15 RX ORDER — BISACODYL 10 MG
10 SUPPOSITORY, RECTAL RECTAL ONCE AS NEEDED
Status: DISCONTINUED | OUTPATIENT
Start: 2025-02-15 | End: 2025-02-15

## 2025-02-15 RX ORDER — SIMETHICONE 80 MG
80 TABLET,CHEWABLE ORAL 3 TIMES DAILY PRN
Status: DISCONTINUED | OUTPATIENT
Start: 2025-02-15 | End: 2025-02-17

## 2025-02-15 RX ORDER — METHYLERGONOVINE MALEATE 0.2 MG/ML
INJECTION INTRAVENOUS
Status: COMPLETED
Start: 2025-02-15 | End: 2025-02-15

## 2025-02-15 RX ORDER — CARBOPROST TROMETHAMINE 250 UG/ML
INJECTION, SOLUTION INTRAMUSCULAR
Status: COMPLETED
Start: 2025-02-15 | End: 2025-02-15

## 2025-02-15 RX ORDER — CHOLECALCIFEROL (VITAMIN D3) 25 MCG
1 TABLET,CHEWABLE ORAL DAILY
Status: DISCONTINUED | OUTPATIENT
Start: 2025-02-15 | End: 2025-02-17

## 2025-02-15 RX ORDER — IBUPROFEN 600 MG/1
600 TABLET, FILM COATED ORAL EVERY 6 HOURS
Status: DISCONTINUED | OUTPATIENT
Start: 2025-02-16 | End: 2025-02-17

## 2025-02-15 RX ORDER — ACETAMINOPHEN 500 MG
500 TABLET ORAL EVERY 6 HOURS PRN
Status: DISCONTINUED | OUTPATIENT
Start: 2025-02-15 | End: 2025-02-15

## 2025-02-15 RX ORDER — CALCIUM CARBONATE 500 MG/1
1000 TABLET, CHEWABLE ORAL 2 TIMES DAILY PRN
Status: DISCONTINUED | OUTPATIENT
Start: 2025-02-15 | End: 2025-02-17

## 2025-02-15 RX ORDER — MISOPROSTOL 200 UG/1
TABLET ORAL
Status: COMPLETED
Start: 2025-02-15 | End: 2025-02-15

## 2025-02-15 RX ORDER — FAMOTIDINE 20 MG/1
20 TABLET, FILM COATED ORAL DAILY
Status: DISCONTINUED | OUTPATIENT
Start: 2025-02-15 | End: 2025-02-17

## 2025-02-15 RX ORDER — METRONIDAZOLE 500 MG/1
500 TABLET ORAL EVERY 12 HOURS SCHEDULED
Status: DISCONTINUED | OUTPATIENT
Start: 2025-02-15 | End: 2025-02-17

## 2025-02-15 NOTE — PROGRESS NOTES
CTSP  Patient feeling more pressure  Gentle SVE: BOW at introitus.  Palpates approximately 3 cm dilated with vertex above cervix.    Continue to monitor.  Continue Magnesium and Ampicillin  Misael aware.

## 2025-02-15 NOTE — PLAN OF CARE
Problem: Patient/Family Goals  Goal: Patient/Family Long Term Goal  Description: Patient's Long Term Goal: Maternal/Fetal wellbeing    Interventions:  - See additional Care Plan goals for specific interventions  Outcome: Progressing  Goal: Patient/Family Short Term Goal  Description: Patient's Short Term Goal: stop contractions    Interventions:   - See additional Care Plan goals for specific interventions  Outcome: Progressing     Problem: PAIN - ADULT  Goal: Verbalizes/displays adequate comfort level or patient's stated pain goal  Description: INTERVENTIONS:  - Encourage pt to monitor pain and request assistance  - Assess pain using appropriate pain scale  - Administer analgesics based on type and severity of pain and evaluate response  - Implement non-pharmacological measures as appropriate and evaluate response  - Consider cultural and social influences on pain and pain management  - Manage/alleviate anxiety  - Utilize distraction and/or relaxation techniques  - Monitor for opioid side effects  - Notify MD/LIP if interventions unsuccessful or patient reports new pain  - Anticipate increased pain with activity and pre-medicate as appropriate  Outcome: Progressing     Problem: ANTEPARTUM/LABOR and DELIVERY  Goal: Maintain pregnancy as long as maternal and/or fetal condition is stable  Description: INTERVENTIONS:  - Maternal surveillance  - Fetal surveillance  - Monitor uterine activity  - Medications as ordered  - Bedrest  Outcome: Progressing  Goal: Anxiety is at manageable level  Description: INTERVENTIONS:  - [place] patient to unit/surroundings  - Establish a trusting relationship with patient  - Discuss possible complications or alterations in birth plan  - Explain treatment plan  - Explain testing/procedures prior to initiation  - Encourage participation in care  - Encourage verbalization of concerns/fears  - Identify coping mechanisms  - Administer/offer alternative therapies (Aroma therapy, distraction,  guided imagery, massage, music therapy, therapeutic touch)  - Manage patient's environment (Avoid overstimulation of patient)  Outcome: Progressing  Goal: Demonstrates ability to cope with hospitalization/illness  Description: INTERVENTIONS:  - Encourage verbalization of feelings/concerns/expectations  - Provide quiet environment  - Assist patient to identify own strengths and abilities  - Encourage patient to set small goals for self  - Encourage participation in diversional activity  - Reinforce positive adaptation of new coping behaviors  - Include patient/family/caregiver in decisions  Outcome: Progressing     Problem: COPING  Goal: Pt/Family able to verbalize concerns and demonstrate effective coping strategies  Description: INTERVENTIONS:  - Assist patient/family to identify coping skills, available support systems and cultural and spiritual values  - Provide emotional support, including active listening and acknowledgement of concerns of patient and caregivers  - Reduce environmental stimuli, as able  - Instruct patient/family in relaxation techniques, as appropriate  - Assess for spiritual and psychosocial needs and initiate Spiritual Care or Behavioral Health consult as needed  Outcome: Progressing

## 2025-02-15 NOTE — PROGRESS NOTES
Pt rec'd per report; requesting to attempt void via bedpan. Pt placed on bedpan; towel btwn legs noted to have small amount bright red blood; no active bleeding visualized. Pt unable to void spontaneously after 15 mins. Bladder palpated significantly distended & taut over pubic bone. Discussed risks of bladder overdistension & side effects of magnesium sulfate; discussed risks of \"pushing\" to void (including possibility of delivering into bedpan); discussed recommendation for bladder management via fitzpatrick catheter. All above discussed w/ pt & pt's mother. Pt refused catheter & further discussion, & became emotionally distraught, yelling & cursing at RN to \"give me some f*ing time!\" Pt's mother requesting to speak privately w/ RN, & charge RN was present for discussion. All above was reiterated. Further explanation was given regarding delays in emptying the bladder resulting in further distention. Additional time was spent discussing w/ pt's mother ways to alleviate pt discomfort if a fitzpatrick were to be come necessary, including lidocaine jelly, a smaller/pediatric catheter, & pre-medication w/ dilaudid. Pt's mother verbalizes understanding of all above. Pt requesting to attempt bedpan again at this time. Charge RN at bedside.

## 2025-02-15 NOTE — PROGRESS NOTES
LABOR PROGRESS NOTE    Subjective:   CTSP for BOW between legs    Objective:     Vitals:    02/15/25 1515   BP: 107/43   Pulse:    Resp:    Temp: 98.4 °F (36.9 °C)     Temp (24hrs), Av.4 °F (36.9 °C), Min:98.1 °F (36.7 °C), Max:98.9 °F (37.2 °C)    FHT: Baseline 140, moderate variability, no decelerations, category 1  Tat Momoli: irreg    SVE: 8 cm bag of water bulging outside introitus between legs  /-2 to 3  AROM clear    Assessment/Plan:   24w6d  Active labor  Inc Cx/PTL  Start pitocin augmentation  FWB reassuring    Tesfaye Ochoa MD  [facility]- Obstetrics & Gynecology

## 2025-02-15 NOTE — PROGRESS NOTES
Patient called this rn into room stating she is having rectal and vaginal pressure, states that it is slightly worse than feeling previous. Dr. Ochoa updated and stated we want to avoid sve if possible unless she feels consistent ctx or urge to push. Patient okay with not being checked at this time. Has scant amount of pink/red discharge on towel under patient, nothing visualized coming from the vagina.  Pt visually upset and states she's having a panic attack. Pt mother at bedside calming patient down, helping with breathing. Pt declines needing any thing else at this time but needs more time to take her morning medications.

## 2025-02-15 NOTE — PROGRESS NOTES
Perfect serve message sent to Cathy BOTELLO for pt request to talk to her again about therapy options /psych.

## 2025-02-15 NOTE — CONSULTS
Patient Name:  Klaudia Cerna  Date: 2025  Requesting Physician: Jose  Thank you for the consultation. I had the opportunity to speak with Klaudia along with her mother and grandma in her room. This was a prenatal consultation requested by the Obstetrician Dr. Garcia. I reviewed her medical records and her prenatal labs and results. This pregnancy was complicated by history of multiple fetal losses in early first trimester before this pregnancy. On the day of consultation she is 24 5/7 weeks gestation by dates. She received a course of  steroids on  and . Her family history is significant for mom being a  32 weeker at [facility]. Her younger sister (Thi Lopez) who is now 12, was a 25 weeks gestation infant at [facility].   Mom received  care with Dr Ricks in [place] and switched care from [facility] to [facility] and was transferred by ambulance.   Mom is aware that the fetus is female and plans to name her Olivia. I discussed the possible outcomes at 24+ weeks gestation. I informed her that at this gestation weeks close to 70-80% of all babies born survive.  I discussed individual organ systems and the impact of prematurity. Respiratory distress and the structural and chemical immaturity were discussed; strategies to deal with this were outlined including mechanical ventilation, administration of surfactant and CPAP. Based upon gestation and the completion of steroid prophylaxis, the need for intervention would be reduced considerably. We also discussed complications of prematurity including but not limited to IVH, PDA, jaundice, electrolyte imbalance, feeding problems, and the need for TPN and central lines while feeding gets established.    I informed mom that a neonatology team including a physician, a  nurse and respiratory therapist would attend the  delivery. The initial management would include keeping the baby warm and comfortable and  supporting the breathing with intubation for surfactant administration and additional mechanical ventilation. The baby would be transferred to the NICU and assessed for other problems of prematurity. It is very likely that a baby of her gestation would need transfusion for anemia. The parents understand that it is routine for babies at this size to have umbilical vessel catheterization for IV access. Further discussions about outcome would be based on evaluation and management in the NICU. Mom has been informed that with advancing gestation beyond 30-32 weeks, her baby has a better chance of survival without handicap and the incidence of RDS is lower. I encouraged her to consider breast milk feeding her baby. Also encouraged her to consider using donor breast milk while her supply improves. This has been shown to reduce the chances of GI complications.   Mom asked appropriate questions and appeared satisfied with the explanations offered.    Thank you again for giving me the opportunity to speak with your patient.     Benito Rodriguez MD, MRCP   Consulting Neonatologist.

## 2025-02-15 NOTE — PROGRESS NOTES
[facility] OB/GYN: ANTEPARTUM PROGRESS NOTE     Subjective:   Patient without complaints.  Good fetal movement, scant bleeding, no leaking fluid, no regular contractions      Objective:     Vitals:    02/15/25 0751   BP: 103/43   Pulse: 99   Resp: 18   Temp:      Vital signs in last 24 hours:  Temp:  [98.2 °F (36.8 °C)-98.5 °F (36.9 °C)] 98.2 °F (36.8 °C)  Pulse:  [] 99  Resp:  [15-18] 18  BP: ()/(34-96) 103/43  SpO2:  [76 %-100 %] 100 %  Temps:   Temp Readings from Last 3 Encounters:   02/15/25 98.2 °F (36.8 °C) (Oral)   10/05/22 97.8 °F (36.6 °C)   21 98 °F (36.7 °C) (Temporal)     Maximum Temperature: Temp (24hrs), Av.3 °F (36.8 °C), Min:98.2 °F (36.8 °C), Max:98.5 °F (36.9 °C)     BPs:  BP Readings from Last 3 Encounters:   02/15/25 103/43   10/05/22 137/77   21 127/78     Weights:  Wt Readings from Last 3 Encounters:   25 130 lb (59 kg)   10/05/22 168 lb (76.2 kg) (91%, Z= 1.35)*   21 158 lb (71.7 kg) (89%, Z= 1.20)*     * Growth percentiles are based on CDC (Girls, 2-20 Years) data.      Physical Examination:  General appearance: Well dressed, well nourished in no apparent distress  Neurologic/Psychiatric: Alert and oriented to person, place and time, mood normal, affect appropriate  Uterus: Gravid, non-tender  Extremities: SCD's    FETAL NONSTRESS TEST:  Baseline FHR: 130 per minute  Fetal heart variability: moderate  Fetal Heart Rate accelerations: 10X10  Fetal Heart Rate decelerations: none  Tracing category 1 currently  Uterine contractions: occasional     Fetal Non-stress Test Interpretation: reassuring    Vaginitis panel positive for BV and Candida  GC/Chlamydia negative  U/A , 21-50 WBC, few squam, rare bacteria    Assessment/Plan:   Hospital Day 2  19 year old  24w6d    #Incompetent cervix with  labor:   - Hourglass membranes  - Vertex position confirmed again this am  - Indocin 25 mg every 6 hrs x 48 hours  - Nifedipine 20 mg every 8  hours  - Magnesium 2 gm/hour  - Diet: Will advance to soft diet  - Activity: bedrest     MFM recommendations  Complete betamethasone  Continue magnesium while concern for  labor and delivery             GBS prophylaxis  If fever or fetus delivering through cervix, recommend Pitocin.  If membranes rupture and fetus is within the uterus, then consider latency antibiotics  Recommend deferring next pregnancy for 18-24 months.  Recommend MFM consultation between pregnancies.  Next pregnancy, recommend cerclage at 12 weeks.    #FWB reassuring:   - Neonatology note appreciated.  - Betamethasone administered  and   - Continuous monitoring  - Ampicillin for GBS prophylaxis     #Indeterminate U/A  - Patient refusing straight cath, using bedpan  - Continue Ampicillin    #BV  - Flagyl po x 5 days.    #Hx of anxiety/disordered eating  - Off meds with positive UCG    Active Problems:  Patient Active Problem List   Diagnosis     labor (HCC)     Tesfaye Ochoa MD  [facility]- Obstetrics & Gynecology

## 2025-02-15 NOTE — PROGRESS NOTES
LABOR PROGRESS NOTE    Subjective:   Patient feeling lots of vaginal pressure    Objective:     Vitals:    02/15/25 1300   BP:    Pulse:    Resp: 18   Temp: 98.9 °F (37.2 °C)     Temp (24hrs), Av.4 °F (36.9 °C), Min:98.1 °F (36.7 °C), Max:98.9 °F (37.2 °C)    FHT: Baseline 140, moderate variability, no decelerations, category 1  [place]: irregular    SVE: bag in vagina to introitus, difficult to do adequate exam, but 4 cm of dilation/80% and head still above cervix    Assessment/Plan:   24w6d  Incompetent cervix/PTL  FWB reassuring  CPM    Tesfaye Ochoa MD  [facility]- Obstetrics & Gynecology

## 2025-02-16 VITALS
DIASTOLIC BLOOD PRESSURE: 73 MMHG | BODY MASS INDEX: 22 KG/M2 | HEART RATE: 56 BPM | TEMPERATURE: 98 F | SYSTOLIC BLOOD PRESSURE: 126 MMHG | OXYGEN SATURATION: 98 % | RESPIRATION RATE: 16 BRPM | WEIGHT: 130 LBS

## 2025-02-16 LAB
HCT VFR BLD AUTO: 27.9 %
HGB BLD-MCNC: 9.8 G/DL
HIV 1+2 AB+HIV1 P24 AG SERPL QL IA: NONREACTIVE
T PALLIDUM AB SER QL IA: NONREACTIVE

## 2025-02-16 RX ORDER — DOCUSATE SODIUM 100 MG/1
100 CAPSULE, LIQUID FILLED ORAL 2 TIMES DAILY
Status: DISCONTINUED | OUTPATIENT
Start: 2025-02-16 | End: 2025-02-17

## 2025-02-16 RX ORDER — ACETAMINOPHEN 500 MG
1000 TABLET ORAL EVERY 6 HOURS PRN
Status: DISCONTINUED | OUTPATIENT
Start: 2025-02-16 | End: 2025-02-17

## 2025-02-16 RX ORDER — ONDANSETRON 2 MG/ML
4 INJECTION INTRAMUSCULAR; INTRAVENOUS EVERY 6 HOURS PRN
Status: DISCONTINUED | OUTPATIENT
Start: 2025-02-16 | End: 2025-02-17

## 2025-02-16 NOTE — DISCHARGE INSTRUCTIONS
[facility] DISCHARGE INSTRUCTIONS     CONGRATULATIONS on the birth of your baby!!  We are happy that we have been able to be of service to you during your pregnancy.  We trust that your experience in the hospital and with the birth of your child has been a pleasant one.  These instructions are for your reference concerning your care at home between now and your six-week check-up.  Please call the office within the next few days to schedule your appointment.    REST  Since fatigue will probably be your biggest problem, you should try to rest in the morning and in the afternoon for at least 1½-2 hours if possible.  Getting up in the middle of the night to feed your baby interrupts your sleep cycle; two 4 hour periods of sleep do not equal one 8 hour period.  During the day while your baby is sleeping, do whatever you can to isolate yourself from the rest of the world so you can rest (i.e. turn off the phone ringer, put a sign on the front doorbell).    POSTPARTUM BLUES/DEPRESSION  Mood swings, crying spells, feeling overwhelmed and irritability are very common after childbirth.  Most women (50-80%) will experience some of these symptoms, which can last from a few days to 2 weeks.  If your symptoms last more than 2 weeks and/or include any of the following, you may have postpartum depression:  -feelings of sadness      -inability to care for yourself or your baby  -loss of interest in usual activities     -recurring thoughts of death/suicide  -difficulty sleeping or increased need for sleep   -feelings of worthlessness/hopelessness    Postpartum depression occurs in 8-12% of new mothers and is more common in mothers with a past history of depression.  If you have any of these symptoms and/or they persist for more than 2 weeks, please call our office/answering service or [facility] at [phone].    BREAST FEEDING  It is important to be in a relaxed atmosphere when you  are nursing.  You will find that your breasts will engorge and become tender within the next few days.  Even though the suction your baby creates may not meet your expectations, remember that he/she is just learning.  If your breasts remain hard after nursing, you may use a breast pump to release the remaining milk.  This will stimulate your breasts to produce more milk when the need arises.  If your nipples become sore, you should use Lanisol (lanolin) cream and allow your nipples to air-dry after nursing.  It will take approximately 10-12 days for milk supply and demand to balance, so please be patient.  Breastfeeding requires ample rest, fluids (8-10 glasses of water/day) and adequate calories (approximately 2200cal/day).  Please continue your prenatal vitamins the entire time you are breastfeeding.  The lactation consultants at [facility] are available at [phone] to answer any questions or help with any problems.    BREAST CARE  We advise non-nursing mothers to continuously wear a tight-fitting bra and avoid any nipple/breast stimulation.  You should keep your bra as tight as you can tolerate, as this will help dry up your milk.  If your breasts are painful and feel engorged, you may use ice bags and Motrin for relief.  The engorgement and pain/discomfort will usually resolve within 1 week.  You may have some milky discharge from your breasts for several weeks.           EPISIOTOMY CARE  If you have an episiotomy/tear, the stitches used to close the incision will dissolve by themselves.  This process will take at least six weeks during which you should be careful with the area.  If peripads tend to chafe and irritate your skin, we recommend that you place a Tucks, a gauze filled with soothing medication, between your stitches and the peripad.  Tucks can by purchased at your local pharmacy.  Sitz baths may also aid in pain relief and healing; soak your bottom in a tub filled with room temperature water for 20  minutes once or twice per day.  We prefer that you take showers rather than baths, and avoid swimming, for 4-6 weeks after delivery.    SEXUAL INTERCOURSE  Please avoid tampons, douching and intercourse for at least 4-6 weeks, or until you have stopped bleeding, whichever is longer.  When you do resume intercourse, realize that you can start ovulating/become pregnant as early as 2-3 weeks after delivery, even if you are breastfeeding.  Please ask one of your doctors or call the office if you have any questions or desire contraception for use before your six-week check-up.  You may notice more vaginal dryness than usual, especially if you are breastfeeding.  An over-the-counter lubricant such as Replens or Astroglide may provide some relief.  Pain/discomfort at the episiotomy site is not unusual and may last anywhere from 2 weeks to several months.  Massaging the area may help to soften the scar tissue and hasten the healing process.    MENSTRUATION  You may have a vaginal discharge/light bleeding for anywhere from 2-6 weeks after delivery.  The flow may taper off and then suddenly become heavier a few weeks later.  Your first real period may come any time from 4-12 weeks after delivery, but may not come at all if you are nursing.    EXERCISE  We recommend that you avoid strenuous exercise or housework for at least 3-6 weeks after delivery. Realize that your exercise tolerance will be reduced, and therefore you should start slowly and increase gradually.  Walking is acceptable, unless it causes too much discomfort or fatigue.  You should climb stairs the least amount possible for the first week or two after delivery.  When you do climb them, take them slowly.  Avoid making numerous trips when you can organize and make just one.  You should also avoid driving a car, if possible, for the first 1-2 weeks.  Kegels exercises are important to maintain good pelvic muscle tone and help eliminate any urine leakage.  The  exercises involve tightening the muscles around the vagina; try stopping urination in midstream or squeezing around your fingers to learn the correct muscle groups.  Do these exercises several times a day in repetitions of 10; try doing them every time you get to a stoplight or a commercial comes on the TV.    VITAMINS  We strongly encourage you to continue taking your prenatal vitamin until your six-week check-up or until you stop nursing, whichever is longer.  The iron in the vitamin will help to resolve any anemia from the blood loss of the delivery.  Adequate calcium intake is important for all women, but especially while nursing.  Your total calcium intake should be 1200mg/day.  Since the average women gets approximately 500mg in her diet, most women will require 500-700mg of supplemental calcium/day.  You can purchase a supplement such as Tums Ex, Citracal, Oscal or Viactin at your local pharmacy.      Post Partum Resources:     Parents support groups:  Cradle talk online (Monday & Friday at 10am)--- support group for any parents of a  in our community-- [employer]          Nurturing Mom-- for any expectant or new mom who may be experiencing anxiety, stress or depression.  It is lead by a licensed clinical therapist with the option of in-person or online meetings: In person meets once a month for [facility], registration is required  for either option and online ( and  of the month)  virtual on [employer]         For more information for either Cradle talk or Nurturing Mom or to receive a email invite contact [email or web address]         In person Mom & Baby hour support group is every Wednesday from  am, at [facility] at [address], [place] in the Community Education Room which is located just inside the front door and to the right. No Registration required just show up, for any new babies, doesn't need to be your first!!!  For information  for going in person--- Email [email or web address]         Can always see information about in person group on the [place] facebook-- Babies & Mommies [facility]         La Leche League for breast feeding and parent support, Website: [email or web address]  and for the [place] group and other groups visit https://www.facebook.com/pg/Juany/events/.  to help find a group, all meetings are virtual.         Mom's Line: [phone]   This service is provided by [facility], has a phone line dedicated women (or anyone worried about a women) who may be experiencing signs or symptoms of postpartum depression.              Facebook groups-  for more support when home- Babies [facility] Mommies [facility] --- you can find mom-to-mom advice and the list of speaker topics for cradle talk program.         The Postpartum Depression [place] [employer] [place] offers non-emergency email support and telephone support to women across the [facility]. Scroll down for more information on these services and what to do if you need to talk to someone quickly.         TELEPHONE SUPPORT    If you would like to have telephone support from one of our volunteers please leave a voicemail on our warmline.    [phone]. We aim to return calls within 24 hours.     Psychiatry Resources:     [address] [address]    [phone]    [employer]    [address], [address]    [place] [address]    Call Prasanna Chavis Jr    [phone]      [facility]     [address]    [place] [address]    Call Sissy Taylor    [phone]      Family Counseling Service    [address] [place], [place] [id number] [phone]      [facility]    [address] [place], [place] [address] [phone]      [facility]    [address] [facility] [facility]  [place] [address]    [phone]      [facility]    [address] [place] [place] [address]    [phone]      [facility]    [address] [place], [place] [id number] [phone]      [facility] [place] [address]    [phone]    Online Therapy      [facility]    [address], [address] [address]    Call Alma Aragon    [phone]      [facility], [facility]    [address] [place] [address], [address]    [place] [address] [phone]    Offers online therapy      [facility] Office - [address] [place] [address]  ph: [phone]    [facility] Community [facility]cerns  [address] [place], [place] [address].  [place], [place]  [phone]    [facility]  [address], [place] [address] [phone]    [facility] Services - [facility] Help Line  Apply for SNAP  or Cash Assistance  [phone]      Apply for Supplemental Security Income (SSI)  https://www.ssa.gov/apply/ssi  Call [phone]  Tell the agent you want to set up an appointment to apply for benefits. They'll schedule it and determine whether it'll be on the phone or in person.

## 2025-02-16 NOTE — PLAN OF CARE
Problem: Patient/Family Goals  Goal: Patient/Family Long Term Goal  Description: Patient's Long Term Goal: Maternal/Fetal wellbeing    Interventions:  - See additional Care Plan goals for specific interventions  2/15/2025 2208 by Cici Lovell RN  Outcome: Completed  2/15/2025 2207 by Cici Lovell RN  Outcome: Progressing  Goal: Patient/Family Short Term Goal  Description: Patient's Short Term Goal: stop contractions    Interventions:   - See additional Care Plan goals for specific interventions  2/15/2025 2208 by Cici Lovell RN  Outcome: Completed  2/15/2025 2207 by Cici Lovlel RN  Outcome: Progressing     Problem: PAIN - ADULT  Goal: Verbalizes/displays adequate comfort level or patient's stated pain goal  Description: INTERVENTIONS:  - Encourage pt to monitor pain and request assistance  - Assess pain using appropriate pain scale  - Administer analgesics based on type and severity of pain and evaluate response  - Implement non-pharmacological measures as appropriate and evaluate response  - Consider cultural and social influences on pain and pain management  - Manage/alleviate anxiety  - Utilize distraction and/or relaxation techniques  - Monitor for opioid side effects  - Notify MD/LIP if interventions unsuccessful or patient reports new pain  - Anticipate increased pain with activity and pre-medicate as appropriate  2/15/2025 2208 by Cici Lovell RN  Outcome: Completed  2/15/2025 2207 by Cici Lovlel RN  Outcome: Progressing     Problem: ANTEPARTUM/LABOR and DELIVERY  Goal: Maintain pregnancy as long as maternal and/or fetal condition is stable  Description: INTERVENTIONS:  - Maternal surveillance  - Fetal surveillance  - Monitor uterine activity  - Medications as ordered  - Bedrest  2/15/2025 2208 by Cici Lovell RN  Outcome: Completed  2/15/2025 2207 by Cici Lovell RN  Outcome: Progressing  Goal: Anxiety is at manageable level  Description: INTERVENTIONS:  -  [place] patient to unit/surroundings  - Establish a trusting relationship with patient  - Discuss possible complications or alterations in birth plan  - Explain treatment plan  - Explain testing/procedures prior to initiation  - Encourage participation in care  - Encourage verbalization of concerns/fears  - Identify coping mechanisms  - Administer/offer alternative therapies (Aroma therapy, distraction, guided imagery, massage, music therapy, therapeutic touch)  - Manage patient's environment (Avoid overstimulation of patient)  2/15/2025 2208 by Cici Lovell RN  Outcome: Completed  2/15/2025 2207 by iCci Lovell, RN  Outcome: Progressing  Goal: Demonstrates ability to cope with hospitalization/illness  Description: INTERVENTIONS:  - Encourage verbalization of feelings/concerns/expectations  - Provide quiet environment  - Assist patient to identify own strengths and abilities  - Encourage patient to set small goals for self  - Encourage participation in diversional activity  - Reinforce positive adaptation of new coping behaviors  - Include patient/family/caregiver in decisions  2/15/2025 2208 by Cici Lovell RN  Outcome: Completed  2/15/2025 2207 by Cici Lovell, RN  Outcome: Progressing

## 2025-02-16 NOTE — PROGRESS NOTES
Pt transferred to Mother Baby room 2198 in stable condition. Report given to Aubree LUND. Pt does not wish to visit baby in the NICU at this time.

## 2025-02-16 NOTE — L&D DELIVERY NOTE
Nehemiah Girl [[id number]]      Labor Events     labor?: Yes   steroids?: Partial Course  Antibiotics received during labor?: Yes  Antibiotics (enter # doses in comment): ampicillin (Comment: x1 dose)  Rupture date/time: 2/15/2025 1558     Rupture type: AROM  Fluid color: Clear  Labor type: Spontaneous Onset of Labor  Augmentation: None  Intrapartum & labor complications:  labor       Labor Event Times    Labor onset date/time: 2025 0314  Dilation complete date/time: 2/15/2025 1917  Start pushing date/time: 2/15/2025 1920       [place] Presentation    Presentation: Vertex  Position: Right Occiput Anterior       Operative Delivery    Operative Vaginal Delivery: No                Shoulder Dystocia    Shoulder Dystocia: No       Anesthesia    Method: None              [place] Delivery      Head delivery date/time: 2/15/2025 19:20:54   Delivery date/time:  2/15/25 19:20:54   Delivery type: Normal spontaneous vaginal delivery    Details:     Delivery location: delivery room       Delivery Providers    Delivering Clinician: Tesfaye Ochoa MD   Delivery personnel:  Provider Role    Baby Nurse   Tanya Suarez RN Delivery Nurse   Deyanira Burrell MD Neonatologist             Cord    Vessels: 3 Vessels  Complications: None  # of loops: 0  Timed cord clamping: No  Cord blood disposition: to lab  Gases sent?: No       Resuscitation    Method: Intubation, PPV, Oxygen        Measurements      Weight: 762 g 1 lb 10.9 oz Length: 34.3 cm     Head circum.: 23 cm Chest circum.: 19.5 cm      Abdominal circum.: 14 cm           Placenta    Date/time: 2/15/2025 1924  Removal: Spontaneous  Appearance: Intact  Disposition: Pathology       Apgars    Living status: Living   Apgar Scoring Key:    0 1 2    Skin color Blue or pale Acrocyanotic Completely pink    Heart rate Absent <100 bpm >100 bpm    Reflex irritability No response Grimace Cry or active withdrawal    Muscle tone Limp Some  flexion Active motion    Respiratory effort Absent Weak cry; hypoventilation Good, crying              1 Minute:  5 Minute:  10 Minute:  15 Minute:  20 Minute:      Skin color: 0  0  1      Heart rate: 1  2  2      Reflex irritablity: 1  1  1      Muscle tone: 1  1  1      Respiratory effort: 1  2  2      Total: 4  6  7         Apgars assigned by: ARIELLA   disposition: NICU       Skin to Skin    Reason skin to skin not initiated:  Acuity       Vaginal Count    Initial count RN: iBanca Dixon RN  Initial count Tech: Yan, Shilpipalma Mead   Sharps    Initial counts 11   0    Final counts 11   0    Final count RN: Tanya Suarez RN  Final count MD: Tesfaye Ochoa MD       Lacerations    Episiotomy: None  Perineal lacerations: None      Vaginal laceration?: No      Cervical laceration?: No    Clitoral laceration?: No                [facility]  Delivery Note    Klaudia Cerna Patient Status:  Inpatient    3/13/2005 MRN [id number]   Location [facility] LABOR & DELIVERY Attending Patria Gonsalez MD   Hosp Day # 2 PCP None Pcp     Date of Delivery: 02/15/25    Pre Op Diagnosis:  IUP at 24.6 wks, incompetent cervix,  labor    Post Op Diagnosis: Same - delivered    Procedure: Normal Spontaneous Vaginal Delivery    Surgeon: Tesfaye Ochoa MD      Anesthesia: None    EBL:  See QBL Quantitative Blood Loss (mL)         Findings:    Sex: female     Weight: 762 g      Apgars: 4/6/7   \"Olivia\"    Episiotomy: None  Laceration: None    This patient is a 19 year old year old female  at 24w6d weeks of gestation who was admitted for abdominal pain and was subsequently diagnosed with  labor with hourglass membranes.  Patient monitoring on antepartum for approximately 48 hours with administration of betamethasone, nifedipine, amoxicillin, indocin and magnesium.  Patient also started on Flagyl for bacterial vaginosis.  Patient developed external prolapsed bag of water and  progressed to active labor.  Artificial rupture of membranes performed and pitocin augmentation of labor performed.  The patient progressed to complete and began pushing.  After two pushes, the patient had brought the vertex presentation down to the perineum.  The head was delivered over the perineum in vertex presentation AMY.      There was no nuchal cord.  The anterior and then the posterior shoulder were rapidly delivered without difficulty and the rest of the body followed easily.  The umbilical cord was doubly clamped and transected and the infant immediately taken over the infant warmer for further evaluation by neonatology.      The placenta was then delivered spontaneously intact.  The patient had persistent brisk vaginal bleeding and atony that required IM methergine, TXA, IM hemabate and rectal misoprostol, with repetitive bimanual massage performed to achieve persistent uterine atony and hemostasis.    The patient is stable, asymptomatic and blood loss is within the expected amount following delivery.  Patient does not meet ACOG criteria for hemorrhage at this time.     The mother was stable for normal post partum recovery and the infant taken to the NICU for further evaluation.  Sponge, instrument and needle counts were correct at the end of the procedure.    Tesfaye Ochoa MD  [facility]- Obstetrics & Gynecology

## 2025-02-16 NOTE — PROGRESS NOTES
[facility]  Post-Partum Vaginal Delivery Progress Note    Klaudia Cerna Patient Status:  Inpatient    3/13/2005 MRN [id number]   Location [facility] 2SW-J Attending Patria Gonsalez MD   Hosp Day # 3 PCP None Pcp     SUBJECTIVE:    Postpartum Day 1 s/p vaginal delivery    The patient is without complaints.  Pain is well controlled with current medications.     Baby is breastfeeding./pumping    OBJECTIVE:    Vital signs in last 24 hours:  Temp:  [97.9 °F (36.6 °C)-99.4 °F (37.4 °C)] 99.4 °F (37.4 °C)  Pulse:  [] 63  Resp:  [16-18] 16  BP: (103-144)/(43-84) 123/82  SpO2:  [98 %-100 %] 98 %    Data Reviewed:  Lab Results   Component Value Date    HGB 9.8 2025    HCT 27.9 2025    MG 7.2 02/15/2025       Intake/Output:    Intake/Output Summary (Last 24 hours) at 2025 1238  Last data filed at 2025 0221  Gross per 24 hour   Intake --   Output 2846 ml   Net -2846 ml        ASSESSMENT:    Post Partum Day # 1 S/P Normal Spontaneous Vaginal Delivery  24 6/7 weeks  Incompetent Cervix  Anemia    PLAN:  IV Venofer  Routine Post partum care.  Probable Discharge home tomorrow in am.      Opal Taylor MD  2025  12:38 PM

## 2025-02-16 NOTE — PROGRESS NOTES
Patient admitted via wheelchair.  Oriented to room.  Safety precautions are initiated.  Bed in low position.  Teaching initiated.  Patient instructed to remain in bed and call for assistance getting up.   Call light within reach.

## 2025-02-17 PROBLEM — F33.0 MILD EPISODE OF RECURRENT MAJOR DEPRESSIVE DISORDER: Status: ACTIVE | Noted: 2025-02-17

## 2025-02-17 PROBLEM — F41.1 GENERALIZED ANXIETY DISORDER: Status: ACTIVE | Noted: 2025-02-17

## 2025-02-17 PROCEDURE — 90792 PSYCH DIAG EVAL W/MED SRVCS: CPT

## 2025-02-17 PROCEDURE — 99238 HOSP IP/OBS DSCHRG MGMT 30/<: CPT | Performed by: OBSTETRICS & GYNECOLOGY

## 2025-02-17 RX ORDER — ARIPIPRAZOLE 5 MG/1
5 TABLET ORAL 2 TIMES DAILY
Qty: 60 TABLET | Refills: 1 | Status: SHIPPED | OUTPATIENT
Start: 2025-02-17

## 2025-02-17 NOTE — DISCHARGE SUMMARY
On call Laborist 25    [facility]  Discharge Summary    Klaudia Cerna Patient Status:  inpatient    3/13/2005 MRN [id number]   Location 2198/2198-A Attending [facility]   Hosp Day # 4 PCP None Pcp     Date of Admission: 2025    Date of Discharge: 25    Admitting Diagnosis: pregnancy   labor (HCC)   labor in second trimester without delivery (HCC)  Anxiety  Hx of disordered eating    Discharge Diagnosis:  labor  Suspected incompetent cervix   s/p  of 24w 6 day female infant  anxiety    Hospital Course: The patient is a 19 year old female now  who was admitted on 2025 for pelvic pressure, cervical shortening, suspected incompetent cervix. She was admitted, given steroids, placed on pretem labor medication. She was seen by teddy. She progressed in labor, magnesium sulfate was given for neuroprotection, antibiotics were given for ptl and bacteria vaginosis. She had prolapse of membranes through vagina, delivery was inevitable. AROM and  Pitocin was initiated. Pain management per patient request. The patient progressed to complete. S/p  on 02/15/2025. Please refer to delivery note for further details. PPD#1, the patient was doing well. She was ambulating and voiding without difficulty. She was tolerating a general diet. Pain was well controlled. PPD#2, the patient continued to do well and was meeting post partum expectations.She will see psych prior to discharge for her anxiety. She was discharged to home and instructed to follow up in 6 weeks.     Consultations: m, neonatology, social service, psychiatry    Procedures:     Complications:  labor of 24 wk pregnancy    Discharge Condition: Stable    Discharge Medications: Current Discharge Medication List       Medication List        STOP taking these medications      ARIPiprazole 10 MG Tabs  Commonly known as: Abilify     cephALEXin 500 MG Caps  Commonly known as: Keflex     OXcarbazepine 150  MG Tabs  Commonly known as: Trileptal                Follow up Visits: Follow-up with [facility] in 6 weeks            Note to patient and family   The 21st Century Cures Act makes medical notes available to patients in the interest of transparency.  However, please be advised that this is a medical document.  It is intended as [id number] communication.  It is written and medical language may contain abbreviations or verbiage that are technical and unfamiliar.  It may appear blunt or direct.  Medical documents are intended to carry relevant information, facts as evident, and the clinical opinion of the practitioner.

## 2025-02-17 NOTE — PROGRESS NOTES
Patient discharged home in stable condition accompanied by mother. Questions/concerns addressed and answered, discharge instructions given and signed per understanding.
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
## 2025-02-17 NOTE — PROGRESS NOTES
On call Laborist 25    [facility]  Post-Partum Progress Note    Klaudia Cerna Patient Status:  Inpatient    3/13/2005 MRN [id number]   Location [facility] 2SW-J Attending Patria Gonsalez MD   Hosp Day # 4 PCP None Pcp     SUBJECTIVE:    Postpartum Day 2:    The patient feels well. The patient denies emotional concerns but would like to resume care with her mental health provider.Pain is well controlled with current medications. The baby is stable in the NICU. The patient tolerating a normal diet.  Lochia is appropriate.    OBJECTIVE:    Vital signs in last 24 hours:  Temp:  [98.1 °F (36.7 °C)] 98.1 °F (36.7 °C)  Pulse:  [56] 56  Resp:  [16] 16  BP: (126)/(73) 126/73  Input/Output:  [unfilled]    General:    alert, appears stated age, and cooperative   Uterine Fundus:   firm, below the umbilicus   DVT Evaluation:  no evidence of DVT     Data Reviewed:  Recent Labs   Lab 25  1323 25  0727   RBC 3.82  --    HGB 12.1 9.8*   HCT 35.2 27.9*   MCV 92.1  --    MCH 31.7  --    MCHC 34.4  --    RDW 13.5  --    NEPRELIM 6.83  --    WBC 9.6  --    .0  --          ASSESSMENT/PLAN:    Status post Vaginal Delivery - doing well  Will see psych today  Dc to home this pm    Continue present management    Patria Gonsalez MD  2025  9:42 AM

## 2025-02-17 NOTE — BH PROGRESS NOTE
[facility] PPD SCREENING    Reason for Referral: Patient scored a 14 on the PPD screening.     Current Stressors:    History of PPD: Patient reports a past hx of depression and anxiety.    Financial: Patient reports she is experiencing financial stress and is interested in being connected with WIC.    Other: Patient denies.     Mental Health Status:    Current Symptoms: Patient reports she is experiencing anxiety and brain fog.    Appearance/General Behavior: Appropriate    General Cognitive Functioning: *   Thought Process: Intact/Linear    Thought Content: Ordinary/Appropriate    Mood/Affect: Calm/Appropriate    Orientation: Alert and oriented x 4    Speech: Appropriate   Homicidal/Suicidal Ideation/Plan: Patient denies any suicidal/homicidal ideations, plans, or intentions.     Living Arrangement:    Who Resides at Home: Patient reports she lives with her grandmother.    Has other Children: Patient denies.     Is Father of the Baby involved: Patient reports the father of her baby is not involved.     Has Familial Support: Patient's mother was present via phone during the screening. Patient reports her mother and grandmother are supportive.     Has Other Support Network: Patient reports her best friend is supportive.     Plan:    Provide PPD Information:     Postpartum Depression Resources Given: Yes    [employer] Information Given: Yes    Depression During and After Pregnancy Information given: Yes   Provide [facility] Contact Information: Yes   Other Information Given: Psychiatry resources entered into discharge instructions.

## 2025-02-17 NOTE — CM/SW NOTE
SW met with pt and her mother to complete assessment and offer support. Pt and her mother presented with a cheerful affect.     Pt reports she lives in [place] with her grandmother. Reports strong support from family, and friends. Reports father of the baby is not involved. Pt mother lives in [place], and is only visiting for a few more days. She plans to return in March for pt baby shower. Pt mother has hx with [facility] NICU. Reports both of her dtrs were born at [facility] and were admitted to NICU.    Discussed SDOH, denies any immediate concerns. SW discussed WIC, SNAP, housing assistance, SSI, and resources through Medicaid plan. Discussed information will be in AVS. Discussed [place] to follow up for Medicaid add on for baby. Per Mom she has active [place] Medicaid. Discussed calling plan and asking about their first time mom program.     Pt reports hx of anxiety, or depression. Denies any immediate concerns. Confirmed she met with psych liaison, and was provided resources. Pt reports she is planning to resume her medications. SW offered support and encouraged her to reach out to psychiatry resources provided by liaison.     Is the patient receiving evaluation and/or treatment  21 years of age or younger?  Yes  Are the parents/guardians/caregivers 18 years of age or older? (If not, they must be accompanied by a legal guardian unless legally emancipated.)  Yes  Has the family been cleared of symptoms, diagnosis, or exposure to any infectious disease ( flu, cold, chickenpox, TB, COVID-19, etc) and environmental issues (such as bed bugs, scabies, or lice.)?  Yes  Does anyone in the guest party have an open child abuse or neglect case or a pending investigation for child abuse or neglect?  No  Families must be appropriate for community living- is there a history of substance abuse, domestic violence, or any violent or sex related crimes?   No  All guests 18 years of age and older are required to show a photo ID  ('s License, Passport, or State ID). Have you reviewed this requirement with the guests?   Yes  Overnight guests must be referred by the hospital staff & only (2) registered guests are allowed to stay per fire code. Children are not permitted overnight, except breastfeeding infants, with hospital approval. Have you reviewed this information with the family?   Yes    SW to print out SSI information for mother. Mother reports hx of THC use in early pregnancy for hyperemesis. Cord tox for baby pending.    SW/SILVIO to remain available for dc planning, and/or additional need for support.    BLADIMIR Parham  Discharge Planner  [id number]

## 2025-02-19 ENCOUNTER — TELEPHONE (OUTPATIENT)
Dept: OBGYN UNIT | Facility: HOSPITAL | Age: 20
End: 2025-02-19

## 2025-02-19 ENCOUNTER — PATIENT OUTREACH (OUTPATIENT)
Dept: CASE MANAGEMENT | Age: 20
End: 2025-02-19

## 2025-02-19 NOTE — PROGRESS NOTES
EZEQUI[facility] Post Partum appointment request (delivered 02/15)    Dr Tesfaye Ochoa  [address] [place] [address] [place] [address] [id number] [phone]  Follow up 6 weeks    Attempt #1:  Unable to contact patient (mailbox full)

## 2025-02-19 NOTE — PROGRESS NOTES
ATTEMPTED TO CONTACT PT FOR CRADLE CALL. PT'S VOICEMAIL BOX IS FULL SO WAS NOT ABLE TO LEAVE A MESSAGE. CRADLE CALL LETTER, BF FLYER  AND MOM SUPPORT INFO SENT TO MY PT VIA [employer] Messaging.

## 2025-02-20 NOTE — PROGRESS NOTES
Hospital Follow up for Post Partum (Discharge 2/15 edw)     EZEQUIEL Ochoa: Follow up 6wp/p  [address]  [address] [address] [phone]    Attempt 2: Unable to leave  ; mailbox was full

## 2025-02-21 NOTE — PROGRESS NOTES
Hospital Follow up for Post Partum (Discharge 2/15 edw)     EZEQUIEL Ochoa: Follow up 6w p/p  [address]  [address] [address] [phone]  unable to contact pt after multiple attempts    Attempt 3: Line ringing then silence/ line disconnected  Unable to leave VM

## 2025-03-26 PROBLEM — O60.02 PRETERM LABOR IN SECOND TRIMESTER WITHOUT DELIVERY (HCC): Status: RESOLVED | Noted: 2025-02-15 | Resolved: 2025-03-26

## 2025-03-26 PROBLEM — A60.00 GENITAL HSV: Status: ACTIVE | Noted: 2025-03-26

## 2025-03-27 ENCOUNTER — OFFICE VISIT (OUTPATIENT)
Dept: OBGYN CLINIC | Facility: CLINIC | Age: 20
End: 2025-03-27
Payer: MEDICAID

## 2025-03-27 VITALS
WEIGHT: 147 LBS | SYSTOLIC BLOOD PRESSURE: 114 MMHG | DIASTOLIC BLOOD PRESSURE: 62 MMHG | BODY MASS INDEX: 25.1 KG/M2 | HEIGHT: 64 IN | HEART RATE: 93 BPM

## 2025-03-27 DIAGNOSIS — Z12.4 SCREENING FOR CERVICAL CANCER: ICD-10-CM

## 2025-03-27 DIAGNOSIS — F41.9 ANXIETY AND DEPRESSION: ICD-10-CM

## 2025-03-27 DIAGNOSIS — F32.A ANXIETY AND DEPRESSION: ICD-10-CM

## 2025-03-27 PROCEDURE — 88175 CYTOPATH C/V AUTO FLUID REDO: CPT | Performed by: OBSTETRICS & GYNECOLOGY

## 2025-03-27 RX ORDER — ACETAMINOPHEN AND CODEINE PHOSPHATE 120; 12 MG/5ML; MG/5ML
0.35 SOLUTION ORAL DAILY
Qty: 84 TABLET | Refills: 3 | Status: SHIPPED | OUTPATIENT
Start: 2025-03-27

## 2025-03-27 NOTE — PROGRESS NOTES
Subjective:  Chief Complaint   Patient presents with    Postpartum Care     Pt is here for 6 week postpartum visit ; delivered on 02/15/25;      Klaudia Cerna presents for her 6 week post partum exam after vaginal delivery 24 wk  delivery with incompetent cervix.  She denies any gastrointestinal/genitourinary complaints.  Slight increase in mood symptoms.  No SI/HI.  Infant doing well.  Pumping/Bottle feeding without difficulty.  No fever, vaginal discharge, or abdominal pain.  No further lochia.    No gestational diabetes    Objective:  /62   Pulse 93   Ht 64\"   Wt 147 lb (66.7 kg)   LMP 2025 (Approximate)   Breastfeeding Yes   BMI 25.23 kg/m²   Physical Exam:  Breasts: Declined  Abdomen: Soft, non-tender, non-distended, no masses.  Pelvic:    External genitalia- Normal, Bartholin's, urethra, skeins glands normal   Vagina- No vaginal lesions, no discharge   Cervix- No lesions, long/closed, no cervical motion tenderness   Uterus- Normal size, non-tender, no masses   Adnexa-  Non-tender, no masses  Perineum:  Normal    Assessment:  Normal post partum examination.  Doing well. OK to return to normal activities/work.  Pap smear collected.    Plan:  Follow up one year for routine annual gynecologic examination.    Patient desires progestin only minipill.  12 month prescription with instructions and warnings.  No personal or family contraindications.  Call when done nursing to switch to combination OCP if desired.      Reviewed importance of avoiding unplanned pregnancy, early ob visit to plan for likely cerclage.  Encouraged to consider LARC Nexplanon or IUD.     Diagnoses and all orders for this visit:    Postpartum state (HCC)    Screening for cervical cancer  -     ThinPrep PAP with HPV Reflex Request B; Future    Anxiety and depression  -      NAVIGATOR    Other orders  -     Norethindrone 0.35 MG Oral Tab; Take 1 tablet (0.35 mg total) by mouth daily.      Return for Annual  Well Woman Exam.       Clear

## 2025-04-02 ENCOUNTER — TELEPHONE (OUTPATIENT)
Age: 20
End: 2025-04-02

## 2025-04-02 NOTE — TELEPHONE ENCOUNTER
[facility] **This provider is both MM and therapy **   [address] [facility] [address]  [place] [address]  [phone]    [facility]**This provider is both Psychiatry and therapy **   [address] [place] [address]  [phone]    [facility] **This provider is both Psychiatry and therapy **   [address], [place]  Phone: [phone]    Aminata Ward, Therapist   [address]  [facility] [address]  Phone: [phone]    Alma Aragon, Therapist   [address]  [facility] [address]  Phone: [phone]    DARION Harding   [address]  [facility] [address]  Phone: [phone]    Intake Department, [facility]  [address] [address]  Phone: [phone]

## 2025-04-17 ENCOUNTER — HOSPITAL ENCOUNTER (EMERGENCY)
Facility: HOSPITAL | Age: 20
Discharge: HOME | End: 2025-04-18
Attending: STUDENT IN AN ORGANIZED HEALTH CARE EDUCATION/TRAINING PROGRAM
Payer: MEDICAID

## 2025-04-17 DIAGNOSIS — R10.9 ABDOMINAL PAIN OF UNKNOWN ETIOLOGY: Primary | ICD-10-CM

## 2025-04-17 DIAGNOSIS — K59.00 CONSTIPATION, UNSPECIFIED CONSTIPATION TYPE: ICD-10-CM

## 2025-04-17 LAB
ALBUMIN SERPL-MCNC: 4.8 G/DL (ref 3.2–4.8)
ALBUMIN/GLOB SERPL: 1.7 {RATIO} (ref 1–2)
ALP LIVER SERPL-CCNC: 48 U/L (ref 52–144)
ALT SERPL-CCNC: <7 U/L (ref 10–49)
ANION GAP SERPL CALC-SCNC: 8 MMOL/L (ref 0–18)
AST SERPL-CCNC: 15 U/L (ref ?–34)
B-HCG UR QL: NEGATIVE
BASOPHILS # BLD AUTO: 0.02 X10(3) UL (ref 0–0.2)
BASOPHILS NFR BLD AUTO: 0.3 %
BILIRUB SERPL-MCNC: 0.8 MG/DL (ref 0.3–1.2)
BILIRUB UR QL STRIP.AUTO: NEGATIVE
BUN BLD-MCNC: 11 MG/DL (ref 9–23)
CALCIUM BLD-MCNC: 9.9 MG/DL (ref 8.7–10.6)
CHLORIDE SERPL-SCNC: 108 MMOL/L (ref 98–112)
CLARITY UR REFRACT.AUTO: CLEAR
CO2 SERPL-SCNC: 26 MMOL/L (ref 21–32)
CREAT BLD-MCNC: 0.81 MG/DL (ref 0.55–1.02)
EGFRCR SERPLBLD CKD-EPI 2021: 105 ML/MIN/1.73M2 (ref 60–?)
EOSINOPHIL # BLD AUTO: 0.05 X10(3) UL (ref 0–0.7)
EOSINOPHIL NFR BLD AUTO: 0.9 %
ERYTHROCYTE [DISTWIDTH] IN BLOOD BY AUTOMATED COUNT: 14.6 %
GLOBULIN PLAS-MCNC: 2.8 G/DL (ref 2–3.5)
GLUCOSE BLD-MCNC: 91 MG/DL (ref 70–99)
GLUCOSE UR STRIP.AUTO-MCNC: NORMAL MG/DL
HCT VFR BLD AUTO: 36.5 % (ref 35–48)
HGB BLD-MCNC: 12.4 G/DL (ref 12–16)
IMM GRANULOCYTES # BLD AUTO: 0 X10(3) UL (ref 0–1)
IMM GRANULOCYTES NFR BLD: 0 %
LEUKOCYTE ESTERASE UR QL STRIP.AUTO: NEGATIVE
LYMPHOCYTES # BLD AUTO: 2.87 X10(3) UL (ref 1–4)
LYMPHOCYTES NFR BLD AUTO: 48.8 %
MCH RBC QN AUTO: 29.7 PG (ref 26–34)
MCHC RBC AUTO-ENTMCNC: 34 G/DL (ref 31–37)
MCV RBC AUTO: 87.3 FL (ref 80–100)
MONOCYTES # BLD AUTO: 0.47 X10(3) UL (ref 0.1–1)
MONOCYTES NFR BLD AUTO: 8 %
NEUTROPHILS # BLD AUTO: 2.47 X10 (3) UL (ref 1.5–7.7)
NEUTROPHILS # BLD AUTO: 2.47 X10(3) UL (ref 1.5–7.7)
NEUTROPHILS NFR BLD AUTO: 42 %
NITRITE UR QL STRIP.AUTO: NEGATIVE
OSMOLALITY SERPL CALC.SUM OF ELEC: 293 MOSM/KG (ref 275–295)
PH UR STRIP.AUTO: 6.5 [PH] (ref 5–8)
PLATELET # BLD AUTO: 188 10(3)UL (ref 150–450)
POTASSIUM SERPL-SCNC: 3.4 MMOL/L (ref 3.5–5.1)
PROT SERPL-MCNC: 7.6 G/DL (ref 5.7–8.2)
PROT UR STRIP.AUTO-MCNC: NEGATIVE MG/DL
RBC # BLD AUTO: 4.18 X10(6)UL (ref 3.8–5.3)
RBC UR QL AUTO: NEGATIVE
SODIUM SERPL-SCNC: 142 MMOL/L (ref 136–145)
SP GR UR STRIP.AUTO: 1.02 (ref 1–1.03)
UROBILINOGEN UR STRIP.AUTO-MCNC: NORMAL MG/DL
WBC # BLD AUTO: 5.9 X10(3) UL (ref 4–11)

## 2025-04-17 PROCEDURE — 80053 COMPREHEN METABOLIC PANEL: CPT

## 2025-04-17 PROCEDURE — 81003 URINALYSIS AUTO W/O SCOPE: CPT | Performed by: STUDENT IN AN ORGANIZED HEALTH CARE EDUCATION/TRAINING PROGRAM

## 2025-04-17 PROCEDURE — 85025 COMPLETE CBC W/AUTO DIFF WBC: CPT | Performed by: STUDENT IN AN ORGANIZED HEALTH CARE EDUCATION/TRAINING PROGRAM

## 2025-04-17 PROCEDURE — 81003 URINALYSIS AUTO W/O SCOPE: CPT

## 2025-04-17 PROCEDURE — 99284 EMERGENCY DEPT VISIT MOD MDM: CPT

## 2025-04-17 PROCEDURE — 81025 URINE PREGNANCY TEST: CPT

## 2025-04-17 PROCEDURE — 36415 COLL VENOUS BLD VENIPUNCTURE: CPT

## 2025-04-17 PROCEDURE — 85025 COMPLETE CBC W/AUTO DIFF WBC: CPT

## 2025-04-17 PROCEDURE — 80053 COMPREHEN METABOLIC PANEL: CPT | Performed by: STUDENT IN AN ORGANIZED HEALTH CARE EDUCATION/TRAINING PROGRAM

## 2025-04-17 PROCEDURE — 99285 EMERGENCY DEPT VISIT HI MDM: CPT

## 2025-04-17 RX ORDER — MULTIVIT,IRON,MINERALS/LUTEIN
TABLET ORAL
COMMUNITY

## 2025-04-18 ENCOUNTER — APPOINTMENT (OUTPATIENT)
Dept: CT IMAGING | Facility: HOSPITAL | Age: 20
End: 2025-04-18
Attending: STUDENT IN AN ORGANIZED HEALTH CARE EDUCATION/TRAINING PROGRAM
Payer: MEDICAID

## 2025-04-18 VITALS
OXYGEN SATURATION: 100 % | RESPIRATION RATE: 18 BRPM | TEMPERATURE: 99 F | BODY MASS INDEX: 25.1 KG/M2 | DIASTOLIC BLOOD PRESSURE: 77 MMHG | SYSTOLIC BLOOD PRESSURE: 117 MMHG | HEART RATE: 67 BPM | HEIGHT: 64 IN | WEIGHT: 147 LBS

## 2025-04-18 LAB
BV BACTERIA DNA VAG QL NAA+PROBE: POSITIVE
C GLABRATA DNA VAG QL NAA+PROBE: NEGATIVE
C KRUSEI DNA VAG QL NAA+PROBE: NEGATIVE
C TRACH DNA SPEC QL NAA+PROBE: NEGATIVE
CANDIDA DNA VAG QL NAA+PROBE: NEGATIVE
N GONORRHOEA DNA SPEC QL NAA+PROBE: NEGATIVE
T VAGINALIS DNA VAG QL NAA+PROBE: NEGATIVE

## 2025-04-18 PROCEDURE — 87591 N.GONORRHOEAE DNA AMP PROB: CPT | Performed by: STUDENT IN AN ORGANIZED HEALTH CARE EDUCATION/TRAINING PROGRAM

## 2025-04-18 PROCEDURE — 81514 NFCT DS BV&VAGINITIS DNA ALG: CPT | Performed by: STUDENT IN AN ORGANIZED HEALTH CARE EDUCATION/TRAINING PROGRAM

## 2025-04-18 PROCEDURE — 87491 CHLMYD TRACH DNA AMP PROBE: CPT | Performed by: STUDENT IN AN ORGANIZED HEALTH CARE EDUCATION/TRAINING PROGRAM

## 2025-04-18 PROCEDURE — 74177 CT ABD & PELVIS W/CONTRAST: CPT | Performed by: STUDENT IN AN ORGANIZED HEALTH CARE EDUCATION/TRAINING PROGRAM

## 2025-04-18 RX ORDER — POLYETHYLENE GLYCOL 3350 17 G/17G
17 POWDER, FOR SOLUTION ORAL DAILY PRN
Qty: 12 EACH | Refills: 0 | Status: SHIPPED | OUTPATIENT
Start: 2025-04-18 | End: 2025-05-18

## 2025-04-18 NOTE — ED INITIAL ASSESSMENT (HPI)
Patient sts 2 months post partum and developed stabbing pain right lower abdomen. Vaginal bleeding a few weeks ago. Pain started tonight. Patient sts her child is still admitted in the hospital. Denies n/v/d.
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
## 2025-04-19 RX ORDER — METRONIDAZOLE 500 MG/1
500 TABLET ORAL 2 TIMES DAILY
Qty: 14 TABLET | Refills: 0 | Status: SHIPPED | OUTPATIENT
Start: 2025-04-19 | End: 2025-04-26

## (undated) NOTE — ED AVS SNAPSHOT
Jama Margi   MRN: [id number]    Department:  [facility] Emergency Department   Date of Visit:  1/2/2020           Disclosure     Insurance plans vary and the physician(s) referred by the ER may not be covered by your plan.  Please contact y tell this physician (or your personal doctor if your instructions are to return to your personal doctor) about any new or lasting problems. The primary care or specialist physician will see patients referred from the [facility] Emergency Department.  Elle Moore

## (undated) NOTE — LETTER
Date & Time: 10/5/2022, 11:17 PM  Patient: Sagar Nelson  Encounter Provider(s): Ramez Flannery DO       To Whom It May Concern:    Jana Holly was seen and treated in our department on 10/5/2022.  She is to be excused from work for 3 days    If you have any questions or concerns, please do not hesitate to call.        _____________________________  Physician/[facility] Signature

## (undated) NOTE — LETTER
Date & Time: 1/2/2020, 5:39 AM  Patient: Bethany Hanna  Encounter Provider(s):    Robert Chapa,        To Whom It May Concern:    Lorenza Lantigua was seen and treated in our department on 1/2/2020.  She should not return to school until 1/